# Patient Record
Sex: FEMALE | Race: WHITE | Employment: OTHER | URBAN - METROPOLITAN AREA
[De-identification: names, ages, dates, MRNs, and addresses within clinical notes are randomized per-mention and may not be internally consistent; named-entity substitution may affect disease eponyms.]

---

## 2017-01-25 ENCOUNTER — HOSPITAL ENCOUNTER (OUTPATIENT)
Dept: LAB | Age: 52
Setting detail: OUTPATIENT SURGERY
Discharge: HOME | End: 2017-01-25
Payer: COMMERCIAL

## 2017-01-25 ENCOUNTER — APPOINTMENT (OUTPATIENT)
Dept: PREADMISSION TESTING | Age: 52
End: 2017-01-25
Payer: COMMERCIAL

## 2017-01-25 ENCOUNTER — APPOINTMENT (OUTPATIENT)
Dept: GENERAL RADIOLOGY | Age: 52
End: 2017-01-25
Attending: SPECIALIST
Payer: COMMERCIAL

## 2017-01-25 ENCOUNTER — APPOINTMENT (OUTPATIENT)
Dept: LAB | Age: 52
End: 2017-01-25
Payer: COMMERCIAL

## 2017-01-25 ENCOUNTER — HOSPITAL ENCOUNTER (OUTPATIENT)
Age: 52
Setting detail: OUTPATIENT SURGERY
Discharge: HOME | End: 2017-01-25
Attending: SPECIALIST | Admitting: SPECIALIST
Payer: COMMERCIAL

## 2017-01-25 ENCOUNTER — NURSE NAVIGATOR (OUTPATIENT)
Dept: SURGERY | Age: 52
End: 2017-01-25

## 2017-01-25 VITALS
OXYGEN SATURATION: 99 % | SYSTOLIC BLOOD PRESSURE: 125 MMHG | WEIGHT: 194.4 LBS | HEIGHT: 66 IN | RESPIRATION RATE: 20 BRPM | HEART RATE: 81 BPM | DIASTOLIC BLOOD PRESSURE: 80 MMHG | TEMPERATURE: 98.1 F | BODY MASS INDEX: 31.24 KG/M2

## 2017-01-25 DIAGNOSIS — Z46.51 FITTING AND ADJUSTMENT OF GASTRIC LAP BAND: ICD-10-CM

## 2017-01-25 DIAGNOSIS — R63.5 WEIGHT GAIN: Primary | ICD-10-CM

## 2017-01-25 LAB
T4 FREE SERPL-MCNC: 1 NG/DL (ref 0.7–1.5)
TSH SERPL DL<=0.05 MIU/L-ACNC: 2.4 UIU/ML (ref 0.36–3.74)

## 2017-01-25 PROCEDURE — 76000 FLUOROSCOPY <1 HR PHYS/QHP: CPT

## 2017-01-25 PROCEDURE — 43999 UNLISTED PROCEDURE STOMACH: CPT | Performed by: SPECIALIST

## 2017-01-25 PROCEDURE — 36415 COLL VENOUS BLD VENIPUNCTURE: CPT | Performed by: SPECIALIST

## 2017-01-25 PROCEDURE — 74011000250 HC RX REV CODE- 250: Performed by: SPECIALIST

## 2017-01-25 PROCEDURE — 84443 ASSAY THYROID STIM HORMONE: CPT | Performed by: SPECIALIST

## 2017-01-25 PROCEDURE — 74011000255 HC RX REV CODE- 255: Performed by: SPECIALIST

## 2017-01-25 PROCEDURE — 84439 ASSAY OF FREE THYROXINE: CPT | Performed by: SPECIALIST

## 2017-01-25 RX ORDER — LIDOCAINE HYDROCHLORIDE 10 MG/ML
INJECTION INFILTRATION; PERINEURAL AS NEEDED
Status: DISCONTINUED | OUTPATIENT
Start: 2017-01-25 | End: 2017-01-25 | Stop reason: HOSPADM

## 2017-01-25 NOTE — PROCEDURES
Lap Band Encounter (fluroscopy clinic)    Mahin Turk is gastric banding patient who had her procedure on 12/29/08. her weight today is 88.2 kg (194 lb 6.4 oz), which correlates to 39% EBW loss. she is here today for Lap Band Adjustment / Fill with Fluoroscopy Guidance. she notes the following issues related to the banding procedure; - here for routine adjustment and suspicious of a possible leak in her system. Surgery related complications; mary anette band pt    Visit Vitals    /80    Pulse 81    Temp 98.1 °F (36.7 °C)    Resp 20    Ht 5' 6\" (1.676 m)    Wt 88.2 kg (194 lb 6.4 oz)    SpO2 99%    BMI 31.38 kg/m2       Past Medical History   Diagnosis Date    GERD (gastroesophageal reflux disease)     Overweight (BMI 25.0-29. 9)     Regurgitation     Smoking     Vomiting      Past Surgical History   Procedure Laterality Date    Pr lap, place adjust gastr band       12/29/2008 by Dr Neetu Pfeiffer Hx cholecystectomy      Hx gyn       LUCIA/BSO    Hx orthopaedic       left foot and achilles    Pr breast surgery procedure unlisted      Hx hernia repair       umbilical    Egd       Current Facility-Administered Medications   Medication Dose Route Frequency Provider Last Rate Last Dose    barium sulfate (EZ PAQUE) 96% (w/w) contrast suspension    PRN Raimundo Kessler MD   30 mL at 01/25/17 1310    lidocaine (XYLOCAINE) 10 mg/mL (1 %) injection    PRN Raimundo Kessler MD   1.5 mL at 01/25/17 1310          Review of Symptoms:     General - No history or complaints of unexpected fever or chills  Cardiac - No history or complaints of chest pain, palpitations, or shortness of breath  Pulmonary - No history or complaints of shortness of breath or productive cough  Gastrointestinal - as noted above        Physical Exam:    General:  alert, cooperative, no distress, appears stated age   Abdomen:   abdomen is soft without significant tenderness, masses, organomegaly or guarding; port in place Incisions: healing well, no significant drainage       Assessment:     1. History of Morbid obesity, status post gastric banding, given the fluro findings of a normal UGI with all 7.2 cc in her band we will proceed with the following adjustment. Plan:     Previous Fill Volume: 7.2 ml   Removed:       Total fill volume after today's adjustment: 7.7  Added: 0.5 ml         no leak in band system       Follow-up in PRN

## 2017-01-25 NOTE — DISCHARGE INSTRUCTIONS
[address]  [address] [address]. [facility] FOR [facility] [address] [facility], [address] [place] [address] Street [address] [address]7 Dioni Garay, Registered Dietician;  [phone]    To schedule your next adjustment, call David Clark @ [phone]    Post Adjustable Gastric Band Fill Instructions    Your band may now be fairly tight and some swelling may occur at the band site following a fill. Therefore, you should:   Stay on liquids for 2 days   Then on soft foods for 2 days   Then resume regular foods    All meals should fit into a 4 oz. (1/2 cup) container. Eating Tips:   Use a small plate   Put your fork down between bites   Eat slowly   Do not eat and drink at the same time. Tips for Weight Loss:   Exercise and protein will  help increase and maintain and build muscle mass   Exercise at least 30-40 min. each day. Include cardiovascular and resistance training.  Drink at least 8 glasses of water every day.  Take you multi-vitamins and B vitamins every day.  Journal your food   Keep carbohydrates less than 50Gm per day. Call for a band adjustment if:   You are losing less than 1 lb per week   You are having increasing hunger between meals    Call for evaluation if you develop reflux or inability to tolerate solid foods. Your band may be too tight. Make sure you have a follow up appointment.         Support Group Meetings  [facility]  2nd Thursday every month  6:00pm

## 2017-01-25 NOTE — IP AVS SNAPSHOT
Current Discharge Medication List  
  
ASK your doctor about these medications Dose & Instructions Dispensing Information Comments Morning Noon Evening Bedtime  
 aspirin delayed-release 81 mg tablet Your next dose is: Today, Tomorrow Other:  ____________ Take  by mouth daily. Refills:  0 PROTONIX 40 mg granules for oral suspension Generic drug:  pantoprazole Your next dose is: Today, Tomorrow Other:  ____________ Dose:  40 mg  
40 mg two (2) times a day. Refills:  0  
     
   
   
   
  
 VITAMIN D2 PO Your next dose is: Today, Tomorrow Other:  ____________ Take  by mouth. Refills:  0

## 2017-01-25 NOTE — IP AVS SNAPSHOT
[address] [place] [address] 
[phone] Patient: Reyes Speaker MRN: [id number] :1965 You are allergic to the following Allergen Reactions Penicillins Unknown (comments) Sulfa (Sulfonamide Antibiotics) Rash Topamax (Topiramate) Other (comments)  
 vision Recent Documentation Height Weight BMI OB Status Smoking Status 1.676 m 88.2 kg 31.38 kg/m2 Hysterectomy Current Every Day Smoker Emergency Contacts Name Discharge Info Relation Home Work Mobile Christopher Conroy DISCHARGE CAREGIVER [3] Spouse [3] [phone] About your hospitalization You were admitted on:  2017 You last received care in the:  [facility] ENDOSCOPY You were discharged on:  2017 Unit phone number:  [phone] Why you were hospitalized Your primary diagnosis was:  Not on File Providers Seen During Your Hospitalizations Provider Role Specialty Primary office phone Bradley Lindsey MD Attending Provider General Surgery [phone] Your Primary Care Physician (PCP) Primary Care Physician Office Phone Office Fax Zenobia Rock [phone] Follow-up Information None Current Discharge Medication List  
  
ASK your doctor about these medications Dose & Instructions Dispensing Information Comments Morning Noon Evening Bedtime  
 aspirin delayed-release 81 mg tablet Your next dose is: Today, Tomorrow Other:  _________ Take  by mouth daily. Refills:  0 PROTONIX 40 mg granules for oral suspension Generic drug:  pantoprazole Your next dose is: Today, Tomorrow Other:  _________ Dose:  40 mg  
40 mg two (2) times a day. Refills:  0  
     
   
   
   
  
 VITAMIN D2 PO Your next dose is: Today, Tomorrow Other:  _________ Take  by mouth. Refills:  0 Discharge Instructions [address]. Logan Rothman [address] [place] [address]. [facility] [address] 
[phone] Devin Lagunas, Registered Dietician;  [phone] To schedule your next adjustment, call Anitra Camtreva @ [phone] Post Adjustable Gastric Band Fill Instructions Your band may now be fairly tight and some swelling may occur at the band site following a fill. Therefore, you should: 
? Stay on liquids for 2 days ? Then on soft foods for 2 days ? Then resume regular foods All meals should fit into a 4 oz. (1/2 cup) container. Eating Tips: 
? Use a small plate ? Put your fork down between bites ? Eat slowly ? Do not eat and drink at the same time. Tips for Weight Loss: 
? Exercise and protein will  help increase and maintain and build muscle mass ? Exercise at least 30-40 min. each day. Include cardiovascular and resistance training. ? Drink at least 8 glasses of water every day. ? Take you multi-vitamins and B vitamins every day. ? Journal your food ? Keep carbohydrates less than 50Gm per day. Call for a band adjustment if: 
? You are losing less than 1 lb per week ? You are having increasing hunger between meals Call for evaluation if you develop reflux or inability to tolerate solid foods. Your band may be too tight. Make sure you have a follow up appointment. Support Group Meetings [facility] 2nd Thursday every month 
6:00pm 
 
Discharge Orders None Introducing [facility] HEALTH SERVICES! Romayne Duster introduces MoFuse patient portal. Now you can access parts of your medical record, email your doctor's office, and request medication refills online. 1. In your internet browser, go to [email or web address] 2. Click on the First Time User? Click Here link in the Sign In box. You will see the New Member Sign Up page. 3. Enter your Movista Access Code exactly as it appears below. You will not need to use this code after youve completed the sign-up process. If you do not sign up before the expiration date, you must request a new code. · Movista Access Code: [email or web address] Expires: 4/23/2017  6:16 AM 
 
4. Enter the last four digits of your Social Security Number (xxxx) and Date of Birth (mm/dd/yyyy) as indicated and click Submit. You will be taken to the next sign-up page. 5. Create a Movista ID. This will be your Movista login ID and cannot be changed, so think of one that is secure and easy to remember. 6. Create a Movista password. You can change your password at any time. 7. Enter your Password Reset Question and Answer. This can be used at a later time if you forget your password. 8. Enter your e-mail address. You will receive e-mail notification when new information is available in [address] 9. Click Sign Up. You can now view and download portions of your medical record. 10. Click the Download Summary menu link to download a portable copy of your medical information. If you have questions, please visit the Frequently Asked Questions section of the Movista website. Remember, Movista is NOT to be used for urgent needs. For medical emergencies, dial 911. Now available from your iPhone and Android! General Information Please provide this summary of care documentation to your next provider. Patient Signature:  ____________________________________________________________ Date:  ____________________________________________________________  
  
Carlitos Police Provider Signature:  ____________________________________________________________ Date:  ____________________________________________________________

## 2017-02-22 ENCOUNTER — APPOINTMENT (OUTPATIENT)
Dept: GENERAL RADIOLOGY | Age: 52
End: 2017-02-22
Attending: SPECIALIST
Payer: COMMERCIAL

## 2017-02-22 ENCOUNTER — HOSPITAL ENCOUNTER (OUTPATIENT)
Age: 52
Setting detail: OUTPATIENT SURGERY
Discharge: HOME | End: 2017-02-22
Attending: SPECIALIST | Admitting: SPECIALIST
Payer: COMMERCIAL

## 2017-02-22 ENCOUNTER — SURGERY (OUTPATIENT)
Age: 52
End: 2017-02-22

## 2017-02-22 VITALS
OXYGEN SATURATION: 100 % | HEIGHT: 66 IN | TEMPERATURE: 97.5 F | HEART RATE: 74 BPM | WEIGHT: 194 LBS | RESPIRATION RATE: 16 BRPM | SYSTOLIC BLOOD PRESSURE: 130 MMHG | DIASTOLIC BLOOD PRESSURE: 86 MMHG | BODY MASS INDEX: 31.18 KG/M2

## 2017-02-22 DIAGNOSIS — Z46.51 FITTING AND ADJUSTMENT OF GASTRIC LAP BAND: ICD-10-CM

## 2017-02-22 PROCEDURE — 76000 FLUOROSCOPY <1 HR PHYS/QHP: CPT

## 2017-02-22 PROCEDURE — 43999 UNLISTED PROCEDURE STOMACH: CPT | Performed by: SPECIALIST

## 2017-02-22 PROCEDURE — 74011000255 HC RX REV CODE- 255: Performed by: SPECIALIST

## 2017-02-22 PROCEDURE — 74011000250 HC RX REV CODE- 250: Performed by: SPECIALIST

## 2017-02-22 RX ORDER — LIDOCAINE HYDROCHLORIDE 10 MG/ML
INJECTION INFILTRATION; PERINEURAL AS NEEDED
Status: DISCONTINUED | OUTPATIENT
Start: 2017-02-22 | End: 2017-02-22 | Stop reason: HOSPADM

## 2017-02-22 RX ADMIN — BARIUM SULFATE 30 ML: 960 POWDER, FOR SUSPENSION ORAL at 14:13

## 2017-02-22 RX ADMIN — LIDOCAINE HYDROCHLORIDE 1.5 ML: 10 INJECTION, SOLUTION INFILTRATION; PERINEURAL at 14:13

## 2017-02-22 NOTE — PROCEDURES
Lap Band Encounter (fluroscopy clinic)    Mahin Turk is gastric banding patient who had her procedure on 12/29/08. her weight today is 88 kg (194 lb), which correlates to  % EBW loss. she is here today for Lap Band Adjustment / Fill with Fluoroscopy Guidance. she notes the following issues related to the banding procedure; - pt with severe epigastric pain after eating 4 grapes earlier today. Surgery related complications; prior mary anette pt    Visit Vitals    /86    Pulse 74    Temp 97.5 °F (36.4 °C)    Resp 16    Ht 5' 6\" (1.676 m)    Wt 88 kg (194 lb)    SpO2 100%    BMI 31.31 kg/m2       Past Medical History:   Diagnosis Date    GERD (gastroesophageal reflux disease)     Overweight (BMI 25.0-29. 9)     Regurgitation     Smoking     Vomiting      Past Surgical History:   Procedure Laterality Date    BREAST SURGERY PROCEDURE UNLISTED      EGD      HX CHOLECYSTECTOMY      HX GYN      LUCIA/BSO    HX HERNIA REPAIR      umbilical    HX ORTHOPAEDIC      left foot and achilles    LAP, PLACE ADJUST GASTR BAND      12/29/2008 by Dr Wilner Schaeffer     Current Facility-Administered Medications   Medication Dose Route Frequency Provider Last Rate Last Dose    barium sulfate (EZ PAQUE) 96% (w/w) contrast suspension    PRN Raimunod Kessler MD   30 mL at 02/22/17 1413    lidocaine (XYLOCAINE) 10 mg/mL (1 %) injection    PRN Raimundo Kessler MD   1.5 mL at 02/22/17 1413          Review of Symptoms:     General - No history or complaints of unexpected fever or chills  Cardiac - No history or complaints of chest pain, palpitations, or shortness of breath  Pulmonary - No history or complaints of shortness of breath or productive cough  Gastrointestinal - as noted above        Physical Exam:    General:  alert, cooperative, no distress, appears stated age   Abdomen:   abdomen is soft without significant tenderness, masses, organomegaly or guarding; port in place   Incisions: healing well, no significant drainage       Assessment:     1. History of Morbid obesity, status post gastric banding, given the fluro findings of retained food in her pouch and distal esophagus we will proceed with the following adjustment. Plan:     Previous Fill Volume: 7.7 ml   Removed: 1 ml    Total fill volume after today's adjustment: 6.7  Added:           pt with retained food in esophagus at 7.7ccs. This is one of many unfills for this patient. She is showing an overall inability to tolerate the band.   She would like to proceed with a single stage band to sleeve resection       Follow-up in PRN

## 2017-02-22 NOTE — DISCHARGE INSTRUCTIONS
Mom will call back   Verneita Cambric Dr. Kristi Dakin  [address] [facility]. [facility] FOR [facility] [address] [facility], [address] [place] [address] Street [address]  [address] Dioni Garay, Registered Dietician;  [phone]    To schedule your next adjustment, call Merari Stewart @ [phone]    Post Adjustable Gastric Band Fill Instructions    Your band may now be fairly tight and some swelling may occur at the band site following a fill. Therefore, you should:   Stay on liquids for 2 days   Then on soft foods for 2 days   Then resume regular foods    All meals should fit into a 4 oz. (1/2 cup) container. Eating Tips:   Use a small plate   Put your fork down between bites   Eat slowly   Do not eat and drink at the same time. Tips for Weight Loss:   Exercise and protein will  help increase and maintain and build muscle mass   Exercise at least 30-40 min. each day. Include cardiovascular and resistance training.  Drink at least 8 glasses of water every day.  Take you multi-vitamins and B vitamins every day.  Journal your food   Keep carbohydrates less than 50Gm per day. Call for a band adjustment if:   You are losing less than 1 lb per week   You are having increasing hunger between meals    Call for evaluation if you develop reflux or inability to tolerate solid foods. Your band may be too tight. Make sure you have a follow up appointment.         Support Group Meetings  [facility]  2nd Thursday every month  6:00pm

## 2017-02-22 NOTE — IP AVS SNAPSHOT
[address] [place] [address] [address] [address] 
[phone] Patient: Williams Jones MRN: [id number] :1965 You are allergic to the following Allergen Reactions Penicillins Unknown (comments) Sulfa (Sulfonamide Antibiotics) Rash Topamax (Topiramate) Other (comments)  
 vision Recent Documentation Height  
  
  
  
  
  
 1.676 m Emergency Contacts Name Discharge Info Relation Home Work Mobile RafiqChristopher DISCHARGE CAREGIVER [3] Spouse [3] [phone] About your hospitalization You were admitted on:  2017 You last received care in the:  [facility] ENDOSCOPY You were discharged on:  2017 Unit phone number:  [phone] Why you were hospitalized Your primary diagnosis was:  Not on File Providers Seen During Your Hospitalizations Provider Role Specialty Primary office phone Berry Shaffer MD Attending Provider General Surgery [phone] Your Primary Care Physician (PCP) Primary Care Physician Office Phone Office Fax Jorge Martinez [phone] Follow-up Information None Your Appointments 2017 GASTRIC BAND ADJUSTMENT with Berry Shaffer MD  
[facility] ENDOSCOPY [facility]) [address] [address] [phone] Current Discharge Medication List  
  
ASK your doctor about these medications Dose & Instructions Dispensing Information Comments Morning Noon Evening Bedtime  
 aspirin delayed-release 81 mg tablet Your next dose is: Today, Tomorrow Other:  _________ Take  by mouth daily. Refills:  0 PROTONIX 40 mg granules for oral suspension Generic drug:  pantoprazole Your next dose is: Today, Tomorrow Other:  _________ Dose:  40 mg  
40 mg two (2) times a day. Refills:  0  
     
   
   
   
  
 VITAMIN D2 PO Your next dose is: Today, Tomorrow Other:  _________ Take  by mouth. Refills:  0 Discharge Instructions Leo Simon [address] [place] [address]. [facility] [place] [address] [place] [address] - [address] 
[phone] Palomo Hung, Registered Dietician;  [phone] To schedule your next adjustment, call Liliana Cockayne @ [phone] Post Adjustable Gastric Band Fill Instructions Your band may now be fairly tight and some swelling may occur at the band site following a fill. Therefore, you should: 
? Stay on liquids for 2 days ? Then on soft foods for 2 days ? Then resume regular foods All meals should fit into a 4 oz. (1/2 cup) container. Eating Tips: 
? Use a small plate ? Put your fork down between bites ? Eat slowly ? Do not eat and drink at the same time. Tips for Weight Loss: 
? Exercise and protein will  help increase and maintain and build muscle mass ? Exercise at least 30-40 min. each day. Include cardiovascular and resistance training. ? Drink at least 8 glasses of water every day. ? Take you multi-vitamins and B vitamins every day. ? Journal your food ? Keep carbohydrates less than 50Gm per day. Call for a band adjustment if: 
? You are losing less than 1 lb per week ? You are having increasing hunger between meals Call for evaluation if you develop reflux or inability to tolerate solid foods. Your band may be too tight. Make sure you have a follow up appointment. Support Group Meetings [facility] 2nd Thursday every month 
6:00pm 
 
Discharge Orders None Introducing [facility] HEALTH SERVICES!    
 Janie Lebron introduces Medium patient portal. Now you can access parts of your medical record, email your doctor's office, and request medication refills online. 1. In your internet browser, go to [email or web address] 2. Click on the First Time User? Click Here link in the Sign In box. You will see the New Member Sign Up page. 3. Enter your Corebook Access Code exactly as it appears below. You will not need to use this code after youve completed the sign-up process. If you do not sign up before the expiration date, you must request a new code. · Corebook Access Code: [email or web address] Expires: 4/23/2017  6:16 AM 
 
4. Enter the last four digits of your Social Security Number (xxxx) and Date of Birth (mm/dd/yyyy) as indicated and click Submit. You will be taken to the next sign-up page. 5. Create a Corebook ID. This will be your Corebook login ID and cannot be changed, so think of one that is secure and easy to remember. 6. Create a Corebook password. You can change your password at any time. 7. Enter your Password Reset Question and Answer. This can be used at a later time if you forget your password. 8. Enter your e-mail address. You will receive e-mail notification when new information is available in [address] 9. Click Sign Up. You can now view and download portions of your medical record. 10. Click the Download Summary menu link to download a portable copy of your medical information. If you have questions, please visit the Frequently Asked Questions section of the Corebook website. Remember, Corebook is NOT to be used for urgent needs. For medical emergencies, dial 911. Now available from your iPhone and Android! General Information Please provide this summary of care documentation to your next provider. Patient Signature:  ____________________________________________________________ Date:  ____________________________________________________________  
  
Erlinda Damonosa  Provider Signature: ____________________________________________________________ Date:  ____________________________________________________________

## 2017-03-01 ENCOUNTER — TELEPHONE (OUTPATIENT)
Dept: SURGERY | Age: 52
End: 2017-03-01

## 2017-03-01 NOTE — TELEPHONE ENCOUNTER
Spoke with pt regarding LB removal to sleeve gastrectomy. Explained to her that [employer] will not approve second surgery due to her loosing more than 50% with LB.  (lowest weight 165 lbs)   Pt stated she would call [employer] herself and argue with them. I again told her I would help her with LB removal only suggested by Dr. Logan Bray.

## 2017-03-08 PROBLEM — R10.13 EPIGASTRIC PAIN: Status: ACTIVE | Noted: 2017-03-08

## 2017-03-08 PROBLEM — K22.719 BARRETT'S ESOPHAGUS WITH DYSPLASIA: Status: ACTIVE | Noted: 2017-03-08

## 2017-03-08 PROBLEM — K22.70 BARRETT'S ESOPHAGUS WITHOUT DYSPLASIA: Status: ACTIVE | Noted: 2017-03-08

## 2018-04-18 ENCOUNTER — OFFICE VISIT (OUTPATIENT)
Dept: SURGERY | Age: 53
End: 2018-04-18

## 2018-04-18 VITALS
OXYGEN SATURATION: 100 % | DIASTOLIC BLOOD PRESSURE: 66 MMHG | WEIGHT: 218.7 LBS | HEART RATE: 71 BPM | SYSTOLIC BLOOD PRESSURE: 148 MMHG | HEIGHT: 66 IN | BODY MASS INDEX: 35.15 KG/M2

## 2018-04-18 DIAGNOSIS — R11.2 INTRACTABLE VOMITING WITH NAUSEA, UNSPECIFIED VOMITING TYPE: ICD-10-CM

## 2018-04-18 DIAGNOSIS — I10 ESSENTIAL HYPERTENSION: ICD-10-CM

## 2018-04-18 DIAGNOSIS — K22.70 BARRETT'S ESOPHAGUS WITHOUT DYSPLASIA: ICD-10-CM

## 2018-04-18 DIAGNOSIS — K21.9 GASTROESOPHAGEAL REFLUX DISEASE WITHOUT ESOPHAGITIS: ICD-10-CM

## 2018-04-18 RX ORDER — LANOLIN ALCOHOL/MO/W.PET/CERES
1000 CREAM (GRAM) TOPICAL DAILY
COMMUNITY

## 2018-04-18 RX ORDER — METOPROLOL TARTRATE 50 MG/1
TABLET ORAL 2 TIMES DAILY
COMMUNITY
End: 2018-05-11

## 2018-04-18 RX ORDER — RANITIDINE 300 MG/1
300 TABLET ORAL
COMMUNITY

## 2018-04-18 NOTE — PROGRESS NOTES
Amirah Mcknight is a prior laparoscopic adjustable gastric band surgery   patient who underwent their weight loss surgical procedure procedure approximately 10 years ago by Dr Diamond King. Despite adequate   weight loss initially, the patient eventually developed pouch issues with dilation and chronic dysphagia, regurgitation and  that resulted in a   issues with weight maintenance and subsequent weight regain . We loosened her band last visit and she continues to experience severe reflux and regurgitation. She does have a history of Sharma's esophagus and is concerned with her symptoms as it pertains to this diagnosis. She would like to have the band removed and move to  A different procedure that will not only improve her weight but also no make her Sharma's worse. Despite several efforts to correct the issue with non-surgical   means she has been unable realize her initial success. Amirah Mcknight is now considering a revision procedure to the Gastric bypass procedure. Patient Active Problem List    Diagnosis Date Noted    Obesity, Class II, BMI 35-39.9, with comorbidity     Hypertension     History of Sharma's esophagus     Former smoker     Sharma's esophagus without dysplasia 03/08/2017    Epigastric pain 03/08/2017    Smoking     Overweight (BMI 25.0-29. 9)     GERD (gastroesophageal reflux disease)     Vomiting     Regurgitation       Past Surgical History:   Procedure Laterality Date    BREAST SURGERY PROCEDURE UNLISTED      EGD      HX CHOLECYSTECTOMY      HX GYN      LUCIA/BSO    HX HERNIA REPAIR      umbilical    HX ORTHOPAEDIC      left foot and achilles    LAP, PLACE ADJUST GASTR BAND      12/29/2008 by Dr Diamond King      Social History   Substance Use Topics    Smoking status: Former Smoker     Quit date: 4/18/2017    Smokeless tobacco: Never Used    Alcohol use Yes      History reviewed. No pertinent family history.    Current Outpatient Prescriptions   Medication Sig Dispense Refill    raNITIdine (ZANTAC) 300 mg tablet Take 300 mg by mouth daily.  cyanocobalamin (VITAMIN B-12) 1,000 mcg tablet Take 1,000 mcg by mouth daily.  B.infantis-B.ani-B.long-B.bifi (PROBIOTIC 4X) 10-15 mg TbEC Take  by mouth.  NALTREXONE HCL/BUPROPION HCL (CONTRAVE PO) Take  by mouth.  metoprolol tartrate (LOPRESSOR) 50 mg tablet Take  by mouth two (2) times a day.  pantoprazole (PROTONIX) 40 mg granules for oral suspension 40 mg two (2) times a day.  ERGOCALCIFEROL, VITAMIN D2, (VITAMIN D2 PO) Take  by mouth.  aspirin delayed-release 81 mg tablet Take  by mouth daily.        Allergies   Allergen Reactions    Penicillins Unknown (comments)    Sulfa (Sulfonamide Antibiotics) Rash    Topamax [Topiramate] Other (comments)     vision          Review of Systems:            General - No history or complaints of unexpected fever, chills, or weight loss  Head/Neck - No history or complaints of headache, diplopia, dysphagia, hearing loss  Cardiac - No history or complaints of chest pain, palpitations, murmur, or shortness of breath  Pulmonary - No history or complaints of shortness of breath, productive cough, hemoptysis  Gastrointestinal -+ reflux since placement of the band,no  abdominal pain, obstipation/constipation, blood per rectum  Genitourinary - No history or complaints of hematuria/dysuria, stress urinary incontinence symptoms, or renal lithiasis  Musculoskeletal - No history or complaints of joint pain or muscular weakness  Hematologic - No history or complaints of bleeding disorders, blood transfusions, sickle cell anemia  Neurologic - No history or complaints of  migraine headaches, seizure activity, syncopal episodes, TIA or stroke  Integumentary - No history or complaints of rashes, abnormal nevi, skin cancer  Gynecological - No history of heavy menses/abnormal menses           Objective:     Visit Vitals    /66 (BP 1 Location: Left arm)    Pulse 71    Ht 5' 6\" (1.676 m)  Wt 99.2 kg (218 lb 11.2 oz)    SpO2 100%    BMI 35.3 kg/m2       Physical Examination: General appearance - alert, well appearing, and in no distress and oriented to person, place, and time  Mental status - alert, oriented to person, place, and time, normal mood, behavior, speech, dress, motor activity, and thought processes  Eyes - pupils equal and reactive, extraocular eye movements intact, sclera anicteric, left eye normal, right eye normal  Ears - right ear normal, left ear normal  Nose - normal and patent, no erythema, discharge or polyps and not examined  Mouth - mucous membranes moist, pharynx normal without lesions  Neck - supple, no significant adenopathy  Lymphatics - no palpable lymphadenopathy, no hepatosplenomegaly  Chest - clear to auscultation, no wheezes, rales or rhonchi, symmetric air entry  Heart - normal rate, regular rhythm, normal S1, S2, no murmurs, rubs, clicks or gallops  Abdomen - soft, nontender, nondistended, no masses or organomegaly  Back exam - full range of motion, no tenderness, palpable spasm or pain on motion  Neurological - alert, oriented, normal speech, no focal findings or movement disorder noted  Musculoskeletal - no joint tenderness, deformity or swelling  Extremities - peripheral pulses normal, no pedal edema, no clubbing or cyanosis    Labs:     No results found for this or any previous visit (from the past 1008 hour(s)). Assessment:     Morbid obesity status post  laparoscopic adjustable gastric band surgery with persistent dysphagia, regurgitation   and a history of Sharma's esophagus. The patient  now has the  request for revision  to the gastric bypass. Plan:     1. At this time period I have spent 45 minutes discussing her anatomy and the issues with the band as it relates to her current symptoms and her specific issues at hand .  She understands the attempts thus far at making her a successful Lap Band patient have not had the beneficial effect that we had hoped for. She is frustrated with her progress to date and now wishes to consider an alternative procedure. She  understands that weight loss surgery is not as effective the second time around and carries a much higher risk overall if we choose to pursue that course of action. The patient understands that the only conversion procedure that is recommended in her situation would be the Gastric bypass procedure. We will proceed with a Lap band removal first then proceed with the gastric bypass after a 3 month medical weight loss plan is completed. Cammy oWods

## 2018-04-18 NOTE — MR AVS SNAPSHOT
[address] [facility] 
 
 
 [facility] [address] [facility] 
[phone] Patient: Zach Lott MRN: [id number] :1965 Visit Information Date & Time Provider Department Dept. Phone Encounter #  
 2018  9:45 AM MD [facility] [place] [id number] Upcoming Health Maintenance Date Due Pneumococcal 19-64 Medium Risk (1 of 1 - PPSV23) 3/6/1986 DTaP/Tdap/Td series (1 - Tdap) 3/6/1988 PAP AKA CERVICAL CYTOLOGY 3/6/1988 BREAST CANCER SCRN MAMMOGRAM 3/6/2017 FOBT Q 1 YEAR AGE 50-75 3/6/2017 Influenza Age 5 to Adult 2017 Allergies as of 2018  Review Complete On: 2018 By: Betty Gibson MD  
  
 Severity Noted Reaction Type Reactions Penicillins  10/03/2016    Unknown (comments) Sulfa (Sulfonamide Antibiotics)  10/03/2016    Rash Topamax [Topiramate]  10/03/2016    Other (comments)  
 vision Current Immunizations  Never Reviewed No immunizations on file. Not reviewed this visit Vitals BP Pulse Height(growth percentile) Weight(growth percentile) SpO2 BMI  
 148/66 (BP 1 Location: Left arm) 71 5' 6\" (1.676 m) 218 lb 11.2 oz (99.2 kg) 100% 35.3 kg/m2 OB Status Smoking Status Hysterectomy Former Smoker BMI and BSA Data Body Mass Index Body Surface Area  
 35.3 kg/m 2 2.15 m 2 Your Updated Medication List  
  
   
This list is accurate as of 18 10:39 AM.  Always use your most recent med list.  
  
  
  
  
 aspirin delayed-release 81 mg tablet Take  by mouth daily. CONTRAVE PO Take  by mouth.  
  
 metoprolol tartrate 50 mg tablet Commonly known as:  LOPRESSOR Take  by mouth two (2) times a day. PROBIOTIC 4X 10-15 mg Tbec Generic drug:  B.infantis-B.ani-B.long-B.bifi Take  by mouth. PROTONIX 40 mg granules for oral suspension Generic drug:  pantoprazole 40 mg two (2) times a day. VITAMIN B-12 1,000 mcg tablet Generic drug:  cyanocobalamin Take 1,000 mcg by mouth daily. VITAMIN D2 PO Take  by mouth. ZANTAC 300 mg tablet Generic drug:  raNITIdine Take 300 mg by mouth daily. Introducing [facility] [place] [facility] & HEALTH SERVICES! [employer] introduces [employer] patient portal. Now you can access parts of your medical record, email your doctor's office, and request medication refills online. 1. In your internet browser, go to [email or web address] 2. Click on the First Time User? Click Here link in the Sign In box. You will see the New Member Sign Up page. 3. Enter your [employer] Access Code exactly as it appears below. You will not need to use this code after youve completed the sign-up process. If you do not sign up before the expiration date, you must request a new code. · [employer] Access Code: [email or web address] Expires: 7/17/2018 10:39 AM 
 
4. Enter the last four digits of your Social Security Number (xxxx) and Date of Birth (mm/dd/yyyy) as indicated and click Submit. You will be taken to the next sign-up page. 5. Create a [employer] ID. This will be your [employer] login ID and cannot be changed, so think of one that is secure and easy to remember. 6. Create a [employer] password. You can change your password at any time. 7. Enter your Password Reset Question and Answer. This can be used at a later time if you forget your password. 8. Enter your e-mail address. You will receive e-mail notification when new information is available in [address] 9. Click Sign Up. You can now view and download portions of your medical record. 10. Click the Download Summary menu link to download a portable copy of your medical information. If you have questions, please visit the Frequently Asked Questions section of the [employer] website. Remember, [employer] is NOT to be used for urgent needs. For medical emergencies, dial 911. Now available from your iPhone and Android! Please provide this summary of care documentation to your next provider. Your primary care clinician is listed as Nate Hawthorne. If you have any questions after today's visit, please call [phone].

## 2018-05-02 ENCOUNTER — CLINICAL SUPPORT (OUTPATIENT)
Dept: SURGERY | Age: 53
End: 2018-05-02

## 2018-05-02 VITALS — BODY MASS INDEX: 34.07 KG/M2 | WEIGHT: 212 LBS | HEIGHT: 66 IN

## 2018-05-02 NOTE — PATIENT INSTRUCTIONS
Goals: 1. Try the [place] Complete multivitamin- hard texture. Take with food or take right before bed to help prevent of nausea. 2. Try the Premier protein drink- sell this at the [facility] in [place]. 3. Continue to not drink soda. Sugar free tea- no sugar- work towards this. 4. Increase activity as able with walking. Continue biking- may increase to 45 minutes and active at work. 5.  Follow the diet guide for band patients more than 6 months post-surgery.

## 2018-05-02 NOTE — PROGRESS NOTES
Medical Weight Loss Multi-Disciplinary Program    Name: Marciano Conroy   : 1965    Session# 1  Date: 2018     Height: 5' 6\" (167.6 cm)    Weight: 96.2 kg (212 lb) lbs. Body mass index is 34.22 kg/(m^2). Pounds Lost: 7    Dietary Instructions    Reviewed intake  Understanding low carbohydrates, low sugar, higher protein meals  Understanding proper portions  Instruction given for personal dietary changes    Comments: Pt given brief pre/post-op diet ed and diet hx reviewed. Physical Activity/Exercise    Discussed Perceived Compliance  Reasonable Goals Set  Motivation  Comments: Pt walks often and a lot at work. She uses her stationary bike daily (5 days per week) for 30 minutes. Goal to increase walking as able, continue bike, and perhaps increase bike to 45 minute duration. Behavior Modification    Positive attitude  Comments: Pt is working on the following goals:    1. Try the [place] Complete multivitamin- hard texture. Take with food or take right before bed to help prevent of nausea. 2. Try the Premier protein drink- sell this at the [facility] in [place]. 3. Continue to not drink soda. Sugar free tea- no sugar- work towards this. 4. Increase activity as able with walking. Continue biking- may increase to 45 minutes and active at work. 5.  Follow the diet guide for band patients more than 6 months post-surgery. Candidate for surgery (per RD): pending    Dietitian: PASCALE Hectorhanna Palacio is a 48 y.o. female who present for a pre-op evaluation. Visit Vitals    Ht 5' 6\" (1.676 m)    Wt 96.2 kg (212 lb)    BMI 34.22 kg/m2     Past Medical History:   Diagnosis Date    Former smoker     GERD (gastroesophageal reflux disease)     History of Sharma's esophagus     Hypertension     Obesity, Class II, BMI 35-39.9, with comorbidity     Overweight (BMI 25.0-29. 9)     Regurgitation     Vomiting            Procedure:  removal of gastric band to gastric bypass, revision       Summary:  Pt given brief pre/post-op diet ed and diet hx reviewed. Pt instructed to follow a low calorie, low carbohydrate, high protein diet of about 6787-7049 calories daily. Pt set several goals. See below. Have cut out regular soda for the past month. Current Vitamins: B complex daily, Vitamin D2 2000 units per day, probiotic, vomited with over 50 so did not take this, Contrave diet presciption currently     What have you done in the past to try to lose weight? Gastric band, [employer], [employer], Nutrisystem, exercise- [employer], own treadmill and exercise bike at home. Why didn't you lose weight or keep the weight off?: Weight watchers lost weight but the minute ate something off of the plan, regained weight       Patient Education and Materials Provided:  Supplement Resource Guide, B Vitamin Information, MVI Recommendations, Calcium Citrate Information, Bariatric Supplement Companies, Protein Supplement Information, Fluid Requirements, No Caffeine or Carbonation, No Alcohol for One Year Post Op, 3 Balanced Meals a Day, Food Group Guide, Exercising and Addressed Current Habits / Changes to make    Nutritional Hx: What is the number of meals you eat per day? 2    Do you eat between meals / snack? yes  Typical snack: yogurt- carb master, Slimfast shakes, Atkins shakes right now     How fast do you eat your meals? Slow and has to stand up since got band. How often do you eat fast food? Never     How many sodas/sugared beverages do you drink per day? Half sugar tea and half Splenda     How many caffeinated drinks do you have per day? Tea, sometimes coffee    How much milk and/or juice do you drink per day? Milk daily- about 2 glasses at 8 oz, 2% milk     How much water do you drink per day? 6 (8oz glasses)    How often do you consume alcohol? occasionally; 3 drinks per year     Diet History:  Breakfast  What are you eating and how much?  Skipped    When? ii Where? iii   Snacks  What are you eating and how much? Grapes    When? ii   Where? iii   Hydration  What are you eating and how much? Slimfast- Advanced- new has 6 carbs    When? 7:30 am    Where? ii   Lunch  What are you eating and how much? Skipped    When? ii   Where? iii   Snacks  What are you eating and how much? Special K Apple cinnamon bar- ate one of these, [employer]ress protein shake    When? ii   Where? iii   Hydration  What are you eating and how much? Water and tea throughout the day    When? ii   Where? iii   Dinner  What are you eating and how much? Tuna Sub on Flat bread with lettuce and cheese- 3 inches of this     When? 6:40 pm    Where? iii   Snacks  What are you eating and how much? i   When? ii   Where? iii   Hydration  What are you eating and how much? i   When? ii   Where? iii     Exercise:  Do you currently have an exercise routine? yes  What kind of exercise do you do? walks, active at work, stationary bike daily . For how long? 30 minutes on the bike For how often? 5 days per week    Goals:   1. Try the [place] Complete multivitamin- hard texture. Take with food or take right before bed to help prevent of nausea. 2. Try the Premier protein drink- sell this at the [facility] in [place]. 3. Continue to not drink soda. Sugar free tea- no sugar- work towards this. 4. Increase activity as able with walking. Continue biking- may increase to 45 minutes and active at work. 5.  Follow the diet guide for band patients more than 6 months post-surgery.

## 2018-05-02 NOTE — MR AVS SNAPSHOT
[address] [facility] 
 
 
 One [facility] [address] 
[phone] Patient: Caridad Blanton MRN: [id number] :1965 Visit Information Date & Time Provider Department Dept. Phone Encounter #  
 2018 10:00 AM TSS [address] [facility] [phone] [id number] Upcoming Health Maintenance Date Due DTaP/Tdap/Td series (1 - Tdap) 3/6/1988 PAP AKA CERVICAL CYTOLOGY 3/6/1988 BREAST CANCER SCRN MAMMOGRAM 3/6/2017 FOBT Q 1 YEAR AGE 50-75 3/6/2017 Influenza Age 5 to Adult 2018 Allergies as of 2018  Review Complete On: 2018 By: [place] Guzman MD  
  
 Severity Noted Reaction Type Reactions Penicillins  10/03/2016    Unknown (comments) Sulfa (Sulfonamide Antibiotics)  10/03/2016    Rash Topamax [Topiramate]  10/03/2016    Other (comments)  
 vision Current Immunizations  Never Reviewed No immunizations on file. Not reviewed this visit Vitals Height(growth percentile) Weight(growth percentile) BMI OB Status Smoking Status 5' 6\" (1.676 m) 212 lb (96.2 kg) 34.22 kg/m2 Hysterectomy Former Smoker BMI and BSA Data Body Mass Index Body Surface Area  
 34.22 kg/m 2 2.12 m 2 Your Updated Medication List  
  
   
This list is accurate as of 18 10:22 AM.  Always use your most recent med list.  
  
  
  
  
 aspirin delayed-release 81 mg tablet Take  by mouth daily. CONTRAVE PO Take  by mouth.  
  
 metoprolol tartrate 50 mg tablet Commonly known as:  LOPRESSOR Take  by mouth two (2) times a day. PROBIOTIC 4X 10-15 mg Tbec Generic drug:  B.infantis-B.ani-B.long-B.bifi Take  by mouth. PROTONIX 40 mg granules for oral suspension Generic drug:  pantoprazole 40 mg two (2) times a day. VITAMIN B-12 1,000 mcg tablet Generic drug:  cyanocobalamin Take 1,000 mcg by mouth daily.   
  
 VITAMIN D2 PO  
 Take  by mouth. ZANTAC 300 mg tablet Generic drug:  raNITIdine Take 300 mg by mouth daily. Patient Instructions Goals: 1. Try the [place] Complete multivitamin- hard texture. Take with food or take right before bed to help prevent of nausea. 2. Try the Premier protein drink- sell this at the [facility] in [place]. 3. Continue to not drink soda. Sugar free tea- no sugar- work towards this. 4. Increase activity as able with walking. Continue biking- may increase to 45 minutes and active at work. 5.  Follow the diet guide for band patients more than 6 months post-surgery. Introducing [facility] [place] [facility]! [facility] introduces [employer] patient portal. Now you can access parts of your medical record, email your doctor's office, and request medication refills online. 1. In your internet browser, go to [email or web address] 2. Click on the First Time User? Click Here link in the Sign In box. You will see the New Member Sign Up page. 3. Enter your [employer] Access Code exactly as it appears below. You will not need to use this code after youve completed the sign-up process. If you do not sign up before the expiration date, you must request a new code. · [employer] Access Code: [email or web address] Expires: 7/17/2018 10:39 AM 
 
4. Enter the last four digits of your Social Security Number (xxxx) and Date of Birth (mm/dd/yyyy) as indicated and click Submit. You will be taken to the next sign-up page. 5. Create a [employer] ID. This will be your [employer] login ID and cannot be changed, so think of one that is secure and easy to remember. 6. Create a [employer] password. You can change your password at any time. 7. Enter your Password Reset Question and Answer. This can be used at a later time if you forget your password. 8. Enter your e-mail address. You will receive e-mail notification when new information is available in [address] 9. Click Sign Up. You can now view and download portions of your medical record. 10. Click the Download Summary menu link to download a portable copy of your medical information. If you have questions, please visit the Frequently Asked Questions section of the [employer] website. Remember, [employer] is NOT to be used for urgent needs. For medical emergencies, dial 911. Now available from your iPhone and Android! Please provide this summary of care documentation to your next provider. Your primary care clinician is listed as Pavithra Perales. If you have any questions after today's visit, please call [phone].

## 2018-05-18 ENCOUNTER — HOSPITAL ENCOUNTER (OUTPATIENT)
Dept: PREADMISSION TESTING | Age: 53
Discharge: HOME | End: 2018-05-18
Attending: SPECIALIST
Payer: COMMERCIAL

## 2018-05-18 ENCOUNTER — TELEPHONE (OUTPATIENT)
Dept: SURGERY | Age: 53
End: 2018-05-18

## 2018-05-18 LAB
ALBUMIN SERPL-MCNC: 3.7 G/DL (ref 3.4–5)
ALBUMIN/GLOB SERPL: 1 {RATIO} (ref 0.8–1.7)
ALP SERPL-CCNC: 68 U/L (ref 45–117)
ALT SERPL-CCNC: 40 U/L (ref 13–56)
ANION GAP SERPL CALC-SCNC: 10 MMOL/L (ref 3–18)
AST SERPL-CCNC: 17 U/L (ref 15–37)
BASOPHILS # BLD: 0 K/UL (ref 0–0.06)
BASOPHILS NFR BLD: 1 % (ref 0–2)
BILIRUB SERPL-MCNC: 0.2 MG/DL (ref 0.2–1)
BUN SERPL-MCNC: 11 MG/DL (ref 7–18)
BUN/CREAT SERPL: 13 (ref 12–20)
CALCIUM SERPL-MCNC: 9.2 MG/DL (ref 8.5–10.1)
CHLORIDE SERPL-SCNC: 104 MMOL/L (ref 100–108)
CO2 SERPL-SCNC: 26 MMOL/L (ref 21–32)
CREAT SERPL-MCNC: 0.82 MG/DL (ref 0.6–1.3)
DIFFERENTIAL METHOD BLD: NORMAL
EOSINOPHIL # BLD: 0.2 K/UL (ref 0–0.4)
EOSINOPHIL NFR BLD: 2 % (ref 0–5)
ERYTHROCYTE [DISTWIDTH] IN BLOOD BY AUTOMATED COUNT: 14.5 % (ref 11.6–14.5)
GLOBULIN SER CALC-MCNC: 3.6 G/DL (ref 2–4)
GLUCOSE SERPL-MCNC: 121 MG/DL (ref 74–99)
HCT VFR BLD AUTO: 37.6 % (ref 35–45)
HGB BLD-MCNC: 12.2 G/DL (ref 12–16)
LYMPHOCYTES # BLD: 2.1 K/UL (ref 0.9–3.6)
LYMPHOCYTES NFR BLD: 25 % (ref 21–52)
MCH RBC QN AUTO: 28.6 PG (ref 24–34)
MCHC RBC AUTO-ENTMCNC: 32.4 G/DL (ref 31–37)
MCV RBC AUTO: 88.1 FL (ref 74–97)
MONOCYTES # BLD: 0.3 K/UL (ref 0.05–1.2)
MONOCYTES NFR BLD: 4 % (ref 3–10)
NEUTS SEG # BLD: 6 K/UL (ref 1.8–8)
NEUTS SEG NFR BLD: 68 % (ref 40–73)
PLATELET # BLD AUTO: 244 K/UL (ref 135–420)
PMV BLD AUTO: 11 FL (ref 9.2–11.8)
POTASSIUM SERPL-SCNC: 3.6 MMOL/L (ref 3.5–5.5)
PROT SERPL-MCNC: 7.3 G/DL (ref 6.4–8.2)
RBC # BLD AUTO: 4.27 M/UL (ref 4.2–5.3)
SODIUM SERPL-SCNC: 140 MMOL/L (ref 136–145)
WBC # BLD AUTO: 8.7 K/UL (ref 4.6–13.2)

## 2018-05-18 PROCEDURE — 36415 COLL VENOUS BLD VENIPUNCTURE: CPT | Performed by: SPECIALIST

## 2018-05-18 PROCEDURE — 80053 COMPREHEN METABOLIC PANEL: CPT | Performed by: SPECIALIST

## 2018-05-18 PROCEDURE — 93005 ELECTROCARDIOGRAM TRACING: CPT

## 2018-05-18 PROCEDURE — 85025 COMPLETE CBC W/AUTO DIFF WBC: CPT | Performed by: SPECIALIST

## 2018-05-19 LAB
ATRIAL RATE: 70 BPM
CALCULATED P AXIS, ECG09: 47 DEGREES
CALCULATED R AXIS, ECG10: 71 DEGREES
CALCULATED T AXIS, ECG11: 75 DEGREES
DIAGNOSIS, 93000: NORMAL
P-R INTERVAL, ECG05: 228 MS
Q-T INTERVAL, ECG07: 384 MS
QRS DURATION, ECG06: 100 MS
QTC CALCULATION (BEZET), ECG08: 414 MS
VENTRICULAR RATE, ECG03: 70 BPM

## 2018-05-29 ENCOUNTER — ANESTHESIA EVENT (OUTPATIENT)
Dept: SURGERY | Age: 53
End: 2018-05-29
Payer: COMMERCIAL

## 2018-05-29 ENCOUNTER — ANESTHESIA (OUTPATIENT)
Dept: SURGERY | Age: 53
End: 2018-05-29
Payer: COMMERCIAL

## 2018-05-29 ENCOUNTER — HOSPITAL ENCOUNTER (OUTPATIENT)
Age: 53
Setting detail: OUTPATIENT SURGERY
Discharge: HOME | End: 2018-05-29
Attending: SPECIALIST | Admitting: SPECIALIST
Payer: COMMERCIAL

## 2018-05-29 VITALS
HEIGHT: 66 IN | TEMPERATURE: 97.6 F | BODY MASS INDEX: 32.99 KG/M2 | WEIGHT: 205.25 LBS | SYSTOLIC BLOOD PRESSURE: 130 MMHG | OXYGEN SATURATION: 100 % | HEART RATE: 59 BPM | DIASTOLIC BLOOD PRESSURE: 66 MMHG | RESPIRATION RATE: 13 BRPM

## 2018-05-29 DIAGNOSIS — G89.18 POST-OP PAIN: Primary | ICD-10-CM

## 2018-05-29 PROCEDURE — 77030008683 HC TU ET CUF COVD -A: Performed by: ANESTHESIOLOGY

## 2018-05-29 PROCEDURE — 77030008477 HC STYL SATN SLP COVD -A: Performed by: ANESTHESIOLOGY

## 2018-05-29 PROCEDURE — 74011250636 HC RX REV CODE- 250/636: Performed by: SPECIALIST

## 2018-05-29 PROCEDURE — 76210000021 HC REC RM PH II 0.5 TO 1 HR: Performed by: SPECIALIST

## 2018-05-29 PROCEDURE — 74011250636 HC RX REV CODE- 250/636

## 2018-05-29 PROCEDURE — 76010000149 HC OR TIME 1 TO 1.5 HR: Performed by: SPECIALIST

## 2018-05-29 PROCEDURE — 77030016151 HC PROTCTR LNS DFOG COVD -B: Performed by: SPECIALIST

## 2018-05-29 PROCEDURE — 76060000033 HC ANESTHESIA 1 TO 1.5 HR: Performed by: SPECIALIST

## 2018-05-29 PROCEDURE — 74011000250 HC RX REV CODE- 250

## 2018-05-29 PROCEDURE — 74011250637 HC RX REV CODE- 250/637: Performed by: ANESTHESIOLOGY

## 2018-05-29 PROCEDURE — 74011000250 HC RX REV CODE- 250: Performed by: SPECIALIST

## 2018-05-29 PROCEDURE — 77030032490 HC SLV COMPR SCD KNE COVD -B: Performed by: SPECIALIST

## 2018-05-29 PROCEDURE — 77030031139 HC SUT VCRL2 J&J -A: Performed by: SPECIALIST

## 2018-05-29 PROCEDURE — 77030006643: Performed by: ANESTHESIOLOGY

## 2018-05-29 PROCEDURE — 77030008603 HC TRCR ENDOSC EPATH J&J -C: Performed by: SPECIALIST

## 2018-05-29 PROCEDURE — 88300 SURGICAL PATH GROSS: CPT | Performed by: SPECIALIST

## 2018-05-29 PROCEDURE — 77030012893: Performed by: SPECIALIST

## 2018-05-29 PROCEDURE — 76210000063 HC OR PH I REC FIRST 0.5 HR: Performed by: SPECIALIST

## 2018-05-29 PROCEDURE — 77030018836 HC SOL IRR NACL ICUM -A: Performed by: SPECIALIST

## 2018-05-29 PROCEDURE — 77030002933 HC SUT MCRYL J&J -A: Performed by: SPECIALIST

## 2018-05-29 PROCEDURE — 77030020782 HC GWN BAIR PAWS FLX 3M -B: Performed by: SPECIALIST

## 2018-05-29 PROCEDURE — 77030011640 HC PAD GRND REM COVD -A: Performed by: SPECIALIST

## 2018-05-29 RX ORDER — SODIUM CHLORIDE, SODIUM LACTATE, POTASSIUM CHLORIDE, CALCIUM CHLORIDE 600; 310; 30; 20 MG/100ML; MG/100ML; MG/100ML; MG/100ML
125 INJECTION, SOLUTION INTRAVENOUS CONTINUOUS
Status: CANCELLED | OUTPATIENT
Start: 2018-05-29

## 2018-05-29 RX ORDER — SCOLOPAMINE TRANSDERMAL SYSTEM 1 MG/1
1 PATCH, EXTENDED RELEASE TRANSDERMAL ONCE
Status: COMPLETED | OUTPATIENT
Start: 2018-05-29 | End: 2018-05-29

## 2018-05-29 RX ORDER — MIDAZOLAM HYDROCHLORIDE 1 MG/ML
INJECTION, SOLUTION INTRAMUSCULAR; INTRAVENOUS AS NEEDED
Status: DISCONTINUED | OUTPATIENT
Start: 2018-05-29 | End: 2018-05-29 | Stop reason: HOSPADM

## 2018-05-29 RX ORDER — SODIUM CHLORIDE, SODIUM LACTATE, POTASSIUM CHLORIDE, CALCIUM CHLORIDE 600; 310; 30; 20 MG/100ML; MG/100ML; MG/100ML; MG/100ML
125 INJECTION, SOLUTION INTRAVENOUS CONTINUOUS
Status: DISCONTINUED | OUTPATIENT
Start: 2018-05-29 | End: 2018-05-29 | Stop reason: HOSPADM

## 2018-05-29 RX ORDER — MAGNESIUM SULFATE 100 %
4 CRYSTALS MISCELLANEOUS AS NEEDED
Status: DISCONTINUED | OUTPATIENT
Start: 2018-05-29 | End: 2018-05-29 | Stop reason: HOSPADM

## 2018-05-29 RX ORDER — EPHEDRINE SULFATE/0.9% NACL/PF 25 MG/5 ML
SYRINGE (ML) INTRAVENOUS AS NEEDED
Status: DISCONTINUED | OUTPATIENT
Start: 2018-05-29 | End: 2018-05-29 | Stop reason: HOSPADM

## 2018-05-29 RX ORDER — NEOSTIGMINE METHYLSULFATE 5 MG/5 ML
SYRINGE (ML) INTRAVENOUS AS NEEDED
Status: DISCONTINUED | OUTPATIENT
Start: 2018-05-29 | End: 2018-05-29 | Stop reason: HOSPADM

## 2018-05-29 RX ORDER — HYDROMORPHONE HYDROCHLORIDE 2 MG/ML
0.5 INJECTION, SOLUTION INTRAMUSCULAR; INTRAVENOUS; SUBCUTANEOUS
Status: DISCONTINUED | OUTPATIENT
Start: 2018-05-29 | End: 2018-05-29 | Stop reason: SDUPTHER

## 2018-05-29 RX ORDER — CIPROFLOXACIN 2 MG/ML
400 INJECTION, SOLUTION INTRAVENOUS EVERY 12 HOURS
Status: COMPLETED | OUTPATIENT
Start: 2018-05-29 | End: 2018-05-29

## 2018-05-29 RX ORDER — OXYCODONE AND ACETAMINOPHEN 5; 325 MG/1; MG/1
1 TABLET ORAL
Qty: 30 TAB | Refills: 0 | Status: SHIPPED | OUTPATIENT
Start: 2018-05-29

## 2018-05-29 RX ORDER — DEXTROSE 50 % IN WATER (D50W) INTRAVENOUS SYRINGE
25-50 AS NEEDED
Status: DISCONTINUED | OUTPATIENT
Start: 2018-05-29 | End: 2018-05-29 | Stop reason: HOSPADM

## 2018-05-29 RX ORDER — ACETAMINOPHEN 10 MG/ML
1000 INJECTION, SOLUTION INTRAVENOUS ONCE
Status: COMPLETED | OUTPATIENT
Start: 2018-05-29 | End: 2018-05-29

## 2018-05-29 RX ORDER — PROPOFOL 10 MG/ML
INJECTION, EMULSION INTRAVENOUS AS NEEDED
Status: DISCONTINUED | OUTPATIENT
Start: 2018-05-29 | End: 2018-05-29 | Stop reason: HOSPADM

## 2018-05-29 RX ORDER — METOCLOPRAMIDE HYDROCHLORIDE 5 MG/ML
INJECTION INTRAMUSCULAR; INTRAVENOUS AS NEEDED
Status: DISCONTINUED | OUTPATIENT
Start: 2018-05-29 | End: 2018-05-29 | Stop reason: HOSPADM

## 2018-05-29 RX ORDER — FENTANYL CITRATE 50 UG/ML
INJECTION, SOLUTION INTRAMUSCULAR; INTRAVENOUS AS NEEDED
Status: DISCONTINUED | OUTPATIENT
Start: 2018-05-29 | End: 2018-05-29 | Stop reason: HOSPADM

## 2018-05-29 RX ORDER — FLUMAZENIL 0.1 MG/ML
0.2 INJECTION INTRAVENOUS
Status: DISCONTINUED | OUTPATIENT
Start: 2018-05-29 | End: 2018-05-29 | Stop reason: HOSPADM

## 2018-05-29 RX ORDER — NALOXONE HYDROCHLORIDE 0.4 MG/ML
0.1 INJECTION, SOLUTION INTRAMUSCULAR; INTRAVENOUS; SUBCUTANEOUS AS NEEDED
Status: DISCONTINUED | OUTPATIENT
Start: 2018-05-29 | End: 2018-05-29 | Stop reason: HOSPADM

## 2018-05-29 RX ORDER — KETOROLAC TROMETHAMINE 30 MG/ML
INJECTION, SOLUTION INTRAMUSCULAR; INTRAVENOUS AS NEEDED
Status: DISCONTINUED | OUTPATIENT
Start: 2018-05-29 | End: 2018-05-29 | Stop reason: HOSPADM

## 2018-05-29 RX ORDER — GLYCOPYRROLATE 0.2 MG/ML
INJECTION INTRAMUSCULAR; INTRAVENOUS AS NEEDED
Status: DISCONTINUED | OUTPATIENT
Start: 2018-05-29 | End: 2018-05-29 | Stop reason: HOSPADM

## 2018-05-29 RX ORDER — INSULIN LISPRO 100 [IU]/ML
INJECTION, SOLUTION INTRAVENOUS; SUBCUTANEOUS ONCE
Status: DISCONTINUED | OUTPATIENT
Start: 2018-05-29 | End: 2018-05-29 | Stop reason: HOSPADM

## 2018-05-29 RX ORDER — ENOXAPARIN SODIUM 100 MG/ML
40 INJECTION SUBCUTANEOUS ONCE
Status: COMPLETED | OUTPATIENT
Start: 2018-05-29 | End: 2018-05-29

## 2018-05-29 RX ORDER — ROCURONIUM BROMIDE 10 MG/ML
INJECTION, SOLUTION INTRAVENOUS AS NEEDED
Status: DISCONTINUED | OUTPATIENT
Start: 2018-05-29 | End: 2018-05-29 | Stop reason: HOSPADM

## 2018-05-29 RX ORDER — SODIUM CHLORIDE, SODIUM LACTATE, POTASSIUM CHLORIDE, CALCIUM CHLORIDE 600; 310; 30; 20 MG/100ML; MG/100ML; MG/100ML; MG/100ML
1000 INJECTION, SOLUTION INTRAVENOUS CONTINUOUS
Status: DISCONTINUED | OUTPATIENT
Start: 2018-05-29 | End: 2018-05-29 | Stop reason: HOSPADM

## 2018-05-29 RX ORDER — LIDOCAINE HYDROCHLORIDE 20 MG/ML
INJECTION, SOLUTION EPIDURAL; INFILTRATION; INTRACAUDAL; PERINEURAL AS NEEDED
Status: DISCONTINUED | OUTPATIENT
Start: 2018-05-29 | End: 2018-05-29 | Stop reason: HOSPADM

## 2018-05-29 RX ORDER — ONDANSETRON 2 MG/ML
INJECTION INTRAMUSCULAR; INTRAVENOUS AS NEEDED
Status: DISCONTINUED | OUTPATIENT
Start: 2018-05-29 | End: 2018-05-29 | Stop reason: HOSPADM

## 2018-05-29 RX ORDER — HYDROMORPHONE HYDROCHLORIDE 1 MG/ML
0.5 INJECTION, SOLUTION INTRAMUSCULAR; INTRAVENOUS; SUBCUTANEOUS
Status: DISCONTINUED | OUTPATIENT
Start: 2018-05-29 | End: 2018-05-29 | Stop reason: HOSPADM

## 2018-05-29 RX ORDER — SODIUM CHLORIDE 0.9 % (FLUSH) 0.9 %
5-10 SYRINGE (ML) INJECTION AS NEEDED
Status: DISCONTINUED | OUTPATIENT
Start: 2018-05-29 | End: 2018-05-29 | Stop reason: HOSPADM

## 2018-05-29 RX ORDER — BUPIVACAINE HYDROCHLORIDE AND EPINEPHRINE 5; 5 MG/ML; UG/ML
INJECTION, SOLUTION EPIDURAL; INTRACAUDAL; PERINEURAL AS NEEDED
Status: DISCONTINUED | OUTPATIENT
Start: 2018-05-29 | End: 2018-05-29 | Stop reason: HOSPADM

## 2018-05-29 RX ADMIN — ENOXAPARIN SODIUM 40 MG: 40 INJECTION SUBCUTANEOUS at 12:00

## 2018-05-29 RX ADMIN — ONDANSETRON 4 MG: 2 INJECTION INTRAMUSCULAR; INTRAVENOUS at 14:52

## 2018-05-29 RX ADMIN — Medication 3 MG: at 15:10

## 2018-05-29 RX ADMIN — LIDOCAINE HYDROCHLORIDE 60 MG: 20 INJECTION, SOLUTION EPIDURAL; INFILTRATION; INTRACAUDAL; PERINEURAL at 14:17

## 2018-05-29 RX ADMIN — SCOPALAMINE 1 PATCH: 1 PATCH, EXTENDED RELEASE TRANSDERMAL at 14:11

## 2018-05-29 RX ADMIN — GLYCOPYRROLATE 0.4 MG: 0.2 INJECTION INTRAMUSCULAR; INTRAVENOUS at 15:10

## 2018-05-29 RX ADMIN — SODIUM CHLORIDE, SODIUM LACTATE, POTASSIUM CHLORIDE, AND CALCIUM CHLORIDE: 600; 310; 30; 20 INJECTION, SOLUTION INTRAVENOUS at 14:51

## 2018-05-29 RX ADMIN — METOCLOPRAMIDE HYDROCHLORIDE 10 MG: 5 INJECTION INTRAMUSCULAR; INTRAVENOUS at 14:52

## 2018-05-29 RX ADMIN — ACETAMINOPHEN 1000 MG: 10 INJECTION, SOLUTION INTRAVENOUS at 14:31

## 2018-05-29 RX ADMIN — FENTANYL CITRATE 50 MCG: 50 INJECTION, SOLUTION INTRAMUSCULAR; INTRAVENOUS at 14:44

## 2018-05-29 RX ADMIN — CIPROFLOXACIN 400 MG: 2 INJECTION INTRAVENOUS at 14:25

## 2018-05-29 RX ADMIN — FENTANYL CITRATE 100 MCG: 50 INJECTION, SOLUTION INTRAMUSCULAR; INTRAVENOUS at 14:11

## 2018-05-29 RX ADMIN — KETOROLAC TROMETHAMINE 30 MG: 30 INJECTION, SOLUTION INTRAMUSCULAR; INTRAVENOUS at 14:52

## 2018-05-29 RX ADMIN — Medication 10 MG: at 14:38

## 2018-05-29 RX ADMIN — PROPOFOL 200 MG: 10 INJECTION, EMULSION INTRAVENOUS at 14:17

## 2018-05-29 RX ADMIN — FENTANYL CITRATE 100 MCG: 50 INJECTION, SOLUTION INTRAMUSCULAR; INTRAVENOUS at 14:53

## 2018-05-29 RX ADMIN — MIDAZOLAM HYDROCHLORIDE 2 MG: 1 INJECTION, SOLUTION INTRAMUSCULAR; INTRAVENOUS at 14:11

## 2018-05-29 RX ADMIN — ROCURONIUM BROMIDE 30 MG: 10 INJECTION, SOLUTION INTRAVENOUS at 14:17

## 2018-05-29 RX ADMIN — SODIUM CHLORIDE, SODIUM LACTATE, POTASSIUM CHLORIDE, AND CALCIUM CHLORIDE 125 ML/HR: 600; 310; 30; 20 INJECTION, SOLUTION INTRAVENOUS at 12:00

## 2018-05-29 NOTE — ANESTHESIA PREPROCEDURE EVALUATION
Anesthetic History     PONV          Review of Systems / Medical History  Patient summary reviewed, nursing notes reviewed and pertinent labs reviewed    Pulmonary    COPD: mild               Neuro/Psych   Within defined limits           Cardiovascular    Hypertension: well controlled              Exercise tolerance: >4 METS     GI/Hepatic/Renal     GERD: well controlled           Endo/Other        Morbid obesity and arthritis     Other Findings            Physical Exam    Airway  Mallampati: II  TM Distance: 4 - 6 cm  Neck ROM: normal range of motion   Mouth opening: Normal     Cardiovascular    Rhythm: regular  Rate: normal         Dental  No notable dental hx       Pulmonary  Breath sounds clear to auscultation               Abdominal  GI exam deferred       Other Findings            Anesthetic Plan    ASA: 2  Anesthesia type: general          Induction: Intravenous  Anesthetic plan and risks discussed with: Patient

## 2018-05-29 NOTE — OP NOTES
[address] REPORT    Name:Lourdes NICE  MR#: [id number]  : 1965  ACCOUNT #: [de-identified]   DATE OF SERVICE: 2018    PREOPERATIVE DIAGNOSIS:  Persistent dysphagia, status post gastric band procedure with history of Sharma's esophagus and severe reflux disease. POSTOPERATIVE DIAGNOSES:  1. Persistent dysphagia, status post gastric band procedure with history of Sharma's esophagus and severe reflux disease. 2.  Large hiatal hernia. 3.  Extensive intra-abdominal adhesions. 4.  Dilated gastric pouch. PROCEDURE PERFORMED  1. Extensive laparoscopic lysis of adhesions of greater than 20 minutes duration. 2.  Removal of lap band and port. SURGEON:  Shakeel Hoover MD      ASSISTANT:  FAINA Beaver. FAINA Lopez assisted with exposure during the procedure, lysis of adhesions, removal of lap band, release of plication sutures and closure of fascial and skin defect. ANESTHESIA:  General endotracheal.    COMPLICATIONS:  None. ESTIMATED BLOOD LOSS:  None. SPECIMENS REMOVED:  Lap band and port specimen. FINDINGS:    1. Patient noted to have a large hiatal hernia present. 2.  She presented a dilated gastric pouch. IMPLANTS:  none. STATEMENT OF MEDICAL NECESSITY:  The patient is a 44-year-old female, prior patient of Dr. Cristobal Alberto, who had a lap band placed about 9 years ago. She developed severe reflux disease and chronic dysphagia as obtained from the band and on an upper GI study, she is noted to have a dilated gastric pouch and dilated esophagus. She, in addition, has a history of Sharma's esophagus. At this juncture, I have recommended the removal of the lap band and she does wish to proceed with a bariatric procedure in the future, and she understands that the only option available to her will be the gastric bypass given her clinical diagnosis of Sharma's esophagus.   Plan today is for lap band and port removal and then a medical weight loss plan followed by potential gastric bypass procedure in the future. OPERATIVE PROCEDURE:  The patient was brought to operating room, placed on the table in supine position prep. General anesthesia was administered without any difficulty. Abdomen was then prepped and draped in the usual sterile fashion. Using 15 blade, a 1 cm incision was made just left of the umbilicus. Veress needle approach was used to connect the peritoneal cavity, which was then insufflated. The Visiport was then placed through that site. Then, the port was exposed to the right upper quadrant region. We freed it from the fascial attachments and removed from the operative field. The remainder of trocars were then placed in the usual fashion. On entering the abdomen, the patient appeared to have classic adhesions consistent with prior lap band placement. I lysed these adhesions from the omentum to the anterior abdominal wall in several areas. We then began to develop the left subhepatic space. I dissected off the attachments below this region and then I was able to identify the collar of the band itself. I then began to lyse adhesions associated with the lap band itself freeing up the entire exposed area of the Lap-Band, including the collar itself. I then divided the collar using the dissection erasto, I was able to remove the band in its entirety from the operative field. Once this was achieved, I then turned my attention to the release of plication sutures, and I went ahead and released 2 of these sutures in the process allowing the stomach to return back to normal position. It was obvious now by inspection, the patient had a dilated gastric pouch. She had a large hiatal hernia present also. With this having been completed, then placed a Surgicel SNoW along all exposed areas, which I dissected. I then removed the liver retractor and then removed all trocars under direct visualization.   I then closed the fascial incision on the right upper quadrant using a fascial stitch of 0 Vicryl suture and all skin incisions were then closed using 4-0 subcuticular Monocryl. Steri-Strips and sterile dressings were applied. The patient tolerated the procedure well.       Shauna Lundberg  D: 05/29/2018 18:06     T: 05/29/2018 18:55  JOB #: [id number]

## 2018-05-29 NOTE — ANESTHESIA POSTPROCEDURE EVALUATION
Post-Anesthesia Evaluation & Assessment    Visit Vitals    /65    Pulse 66    Temp 36.4 °C (97.6 °F)    Resp 15    Ht 5' 6\" (1.676 m)    Wt 93.1 kg (205 lb 4 oz)    SpO2 98%    BMI 33.13 kg/m2       No untreated/active PONV    Post-operative hydration adequate. Adequate post-operative analgesia per PACU discharge criteria    Mental status & level of consciousness: alert and oriented x 3    Respiratory status: patent unassisted airway     No apparent anesthetic complications requiring additional post-anesthetic care    Patient has met all discharge requirements.             Samm Gallo, CRNA

## 2018-05-29 NOTE — IP AVS SNAPSHOT
[address] [address] 
[phone] Patient: Marina Snyder MRN: [id number] :1965 About your hospitalization You were admitted on:  May 29, 2018 [facility] You were discharged on:  May 29, 2018 Why you were hospitalized Your primary diagnosis was:  Not on File Follow-up Information Follow up With Details Comments Contact Info MD Ashley Christianson  
[address] [phone] [facility] [address] [phone] Nikki Champagne MD Go in 1 week(s)  One [facility] [address] [facility] 
[phone] Your Scheduled Appointments 2018 10:30 AM EDT NUTRITION COUNSELING with TSS NUTRI VISIT PEN [facility] [place] ([facility])  
 One [facility] [address] [facility]  
[phone] 2018 10:30 AM EDT  
POST OP with FAINA Rivera [facility] [place] ([facility])  
 One [facility] [address] [facility]  
[phone] Discharge Orders None A check jesus manuel indicates which time of day the medication should be taken. My Medications START taking these medications Instructions Each Dose to Equal  
 Morning Noon Evening Bedtime  
 oxyCODONE-acetaminophen 5-325 mg per tablet Commonly known as:  PERCOCET Your last dose was: Your next dose is: Take 1 Tab by mouth every four (4) hours as needed for Pain. Max Daily Amount: 6 Tabs. 1 Tab CONTINUE taking these medications Instructions Each Dose to Equal  
 Morning Noon Evening Bedtime  
 aspirin delayed-release 81 mg tablet Your last dose was: Your next dose is: Take 81 mg by mouth daily.   
 81 mg  
    
   
   
   
  
 PROBIOTIC 4X 10-15 mg Tbec Generic drug:  B.infantis-B.ani-B.long-B.bifi Your last dose was: Your next dose is: Take  by mouth. PROTONIX 40 mg granules for oral suspension Generic drug:  pantoprazole Your last dose was: Your next dose is:    
   
   
 40 mg two (2) times a day. Indications: gastroesophageal reflux disease 40 mg  
    
   
   
   
  
 SUPER B COMPLEX PO Your last dose was: Your next dose is: Take 1 Cap by mouth daily. 1 Cap TOPROL XL 50 mg XL tablet Generic drug:  metoprolol succinate Your last dose was: Your next dose is: Take 50 mg by mouth nightly. Indications: hypertension 50 mg  
    
   
   
   
  
 VITAMIN B-12 1,000 mcg tablet Generic drug:  cyanocobalamin Your last dose was: Your next dose is: Take 1,000 mcg by mouth daily. 1000 mcg VITAMIN D3 2,000 unit Tab Generic drug:  cholecalciferol (vitamin D3) Your last dose was: Your next dose is: Take 1 Tab by mouth daily. 1 Tab ZANTAC 300 mg tablet Generic drug:  raNITIdine Your last dose was: Your next dose is: Take 300 mg by mouth nightly. Indications: gastroesophageal reflux disease 300 mg Where to Get Your Medications Information on where to get these meds will be given to you by the nurse or doctor. ! Ask your nurse or doctor about these medications  
  oxyCODONE-acetaminophen 5-325 mg per tablet Opioid Education Prescription Opioids: What You Need to Know: 
 
 
 
F-face looks uneven A-arms unable to move or move unevenly S-speech slurred or non-existent T-time-call 911 as soon as signs and symptoms begin-DO NOT go Back to bed or wait to see if you get better-TIME IS BRAIN. Warning Signs of HEART ATTACK Call 911 if you have these symptoms: 
? Chest discomfort. Most heart attacks involve discomfort in the center of the chest that lasts more than a few minutes, or that goes away and comes back. It can feel like uncomfortable pressure, squeezing, fullness, or pain. ? Discomfort in other areas of the upper body. Symptoms can include pain or discomfort in one or both arms, the back, neck, jaw, or stomach. ? Shortness of breath with or without chest discomfort. ? Other signs may include breaking out in a cold sweat, nausea, or lightheadedness. Don't wait more than five minutes to call [address]! Fast action can save your life. Calling 911 is almost always the fastest way to get lifesaving treatment. Emergency Medical Services staff can begin treatment when they arrive  up to an hour sooner than if someone gets to the hospital by car. The discharge information has been reviewed with the patient and caregiver. The patient and caregiver verbalized understanding. Discharge medications reviewed with the patient and caregiver and appropriate educational materials and side effects teaching were provided.  
___________________________________________________________________________ ________________________________________________________ Introducing [facility] HE[facility]! [employer] introduces Fangxin[employer]art patient portal. Now you can access parts of your medical record, email your doctor's office, and request medication refills online. 1. In your internet browser, go to [email or web address] 2. Click on the First Time User? Click Here link in the Sign In box. You will see the New Member Sign Up page. 3. Enter your Pixable Access Code exactly as it appears below. You will not need to use this code after youve completed the sign-up process. If you do not sign up before the expiration date, you must request a new code. · Pixable Access Code: [email or web address] Expires: 7/17/2018 10:39 AM 
 
4. Enter the last four digits of your Social Security Number (xxxx) and Date of Birth (mm/dd/yyyy) as indicated and click Submit. You will be taken to the next sign-up page. 5. Create a Pixable ID. This will be your Pixable login ID and cannot be changed, so think of one that is secure and easy to remember. 6. Create a Pixable password. You can change your password at any time. 7. Enter your Password Reset Question and Answer. This can be used at a later time if you forget your password. 8. Enter your e-mail address. You will receive e-mail notification when new information is available in [address] 9. Click Sign Up. You can now view and download portions of your medical record. 10. Click the Download Summary menu link to download a portable copy of your medical information. If you have questions, please visit the Frequently Asked Questions section of the Pixable website. Remember, Pixable is NOT to be used for urgent needs. For medical emergencies, dial 911. Now available from your iPhone and Android! Introducing Humberto Murray As a [employer] patient, I wanted to make you aware of our electronic visit tool called Humberto Murray. [employer] 24/7 allows you to connect within minutes with a medical provider 24 hours a day, seven days a week via a mobile device or tablet or logging into a secure website from your computer. You can access [employer] from anywhere in the [place]. A virtual visit might be right for you when you have a simple condition and feel like you just dont want to get out of bed, or cant get away from work for an appointment, when your regular [employer] provider is not available (evenings, weekends or holidays), or when youre out of town and need minor care. Electronic visits cost only $49 and if the [employer] 24/7 provider determines a prescription is needed to treat your condition, one can be electronically transmitted to a nearby pharmacy*. Please take a moment to enroll today if you have not already done so. The enrollment process is free and takes just a few minutes. To enroll, please download the [employer] 24/7 jani to your tablet or phone, or visit [email or web address] to enroll on your computer. And, as an [address] [place] [address] patient with a [employer]VPHD account, the results of your visits will be scanned into your electronic medical record and your primary care provider will be able to view the scanned results. We urge you to continue to see your regular [employer] provider for your ongoing medical care. And while your primary care provider may not be the one available when you seek a [employer]One[employer]an[employer] virtual visit, the peace of mind you get from getting a real diagnosis real time can be priceless. For more information on [employer]an[employer], view our Frequently Asked Questions (FAQs) at [email or web address]. Sincerely, 
 
Glenny Sabillon MD 
Chief Medical Officer [employer] *:  certain medications cannot be prescribed via VersionOneishanfin Providers Seen During Your Hospitalization Provider Specialty Primary office phone Emili Ochoa MD General Surgery [phone] Your Primary Care Physician (PCP) Primary Care Physician Office Phone Office Fax Giovani Atkinson [phone] You are allergic to the following Allergen Reactions Penicillins Unknown (comments) Sulfa (Sulfonamide Antibiotics) Rash Topamax (Topiramate) Other (comments)  
 vision Recent Documentation Height Weight BMI OB Status Smoking Status 1.676 m 93.1 kg 33.13 kg/m2 Hysterectomy Former Smoker Emergency Contacts Name Discharge Info Relation Home Work Mobile Christopher Conryo DISCHARGE CAREGIVER [3] Spouse [3]   [phone] Patient Belongings The following personal items are in your possession at time of discharge: 
  Dental Appliances: None  Visual Aid: Glasses, With patient      Home Medications: None   Jewelry: None  Clothing: Footwear, Shirt, Shorts, Socks, Undergarments (5)    Other Valuables: None Please provide this summary of care documentation to your next provider. Signatures-by signing, you are acknowledging that this After Visit Summary has been reviewed with you and you have received a copy. Patient Signature:  ____________________________________________________________ Date:  ____________________________________________________________  
  
[facility] Provider Signature:  ____________________________________________________________ Date:  ____________________________________________________________

## 2018-05-29 NOTE — PERIOP NOTES
TRANSFER - IN REPORT:    Verbal report received from [address] [place] [address] (name) on Kacie Grammes  being received from PACU (unit) for routine progression of care      Report consisted of patients Situation, Background, Assessment and   Recommendations(SBAR). Information from the following report(s) SBAR, Kardex, Procedure Summary and Intake/Output was reviewed with the receiving nurse. Opportunity for questions and clarification was provided. Assessment completed upon patients arrival to unit and care assumed.

## 2018-05-29 NOTE — H&P
Lizeth Valerio is a prior laparoscopic adjustable gastric band surgery   patient who underwent their weight loss surgical procedure procedure approximately 10 years ago by Dr So Reilly. Despite adequate   weight loss initially, the patient eventually developed pouch issues with dilation and chronic dysphagia, regurgitation and  that resulted in a   issues with weight maintenance and subsequent weight regain . We loosened her band last visit and she continues to experience severe reflux and regurgitation. She does have a history of Sharma's esophagus and is concerned with her symptoms as it pertains to this diagnosis. She would like to have the band removed and move to  A different procedure that will not only improve her weight but also no make her Sharma's worse. Despite several efforts to correct the issue with non-surgical   means she has been unable realize her initial success. Lizeth Valerio is now considering a revision procedure to the Gastric bypass procedure.          Patient Active Problem List     Diagnosis Date Noted    Obesity, Class II, BMI 35-39.9, with comorbidity      Hypertension      History of Sharma's esophagus      Former smoker      Sharma's esophagus without dysplasia 03/08/2017    Epigastric pain 03/08/2017    Smoking      Overweight (BMI 25.0-29. 9)      GERD (gastroesophageal reflux disease)      Vomiting      Regurgitation              Past Surgical History:   Procedure Laterality Date    BREAST SURGERY PROCEDURE UNLISTED        EGD        HX CHOLECYSTECTOMY        HX GYN         LUCIA/BSO    HX HERNIA REPAIR         umbilical    HX ORTHOPAEDIC         left foot and achilles    LAP, PLACE ADJUST GASTR BAND         12/29/2008 by Dr So Reilly             Social History   Substance Use Topics    Smoking status: Former Smoker       Quit date: 4/18/2017    Smokeless tobacco: Never Used    Alcohol use Yes       History reviewed. No pertinent family history.    Current Outpatient Prescriptions   Medication Sig Dispense Refill    raNITIdine (ZANTAC) 300 mg tablet Take 300 mg by mouth daily.        cyanocobalamin (VITAMIN B-12) 1,000 mcg tablet Take 1,000 mcg by mouth daily.        B.infantis-B.ani-B.long-B.bifi (PROBIOTIC 4X) 10-15 mg TbEC Take  by mouth.        NALTREXONE HCL/BUPROPION HCL (CONTRAVE PO) Take  by mouth.        metoprolol tartrate (LOPRESSOR) 50 mg tablet Take  by mouth two (2) times a day.        pantoprazole (PROTONIX) 40 mg granules for oral suspension 40 mg two (2) times a day.        ERGOCALCIFEROL, VITAMIN D2, (VITAMIN D2 PO) Take  by mouth.        aspirin delayed-release 81 mg tablet Take  by mouth daily.                Allergies   Allergen Reactions    Penicillins Unknown (comments)    Sulfa (Sulfonamide Antibiotics) Rash    Topamax [Topiramate] Other (comments)       vision            Review of Systems:               General - No history or complaints of unexpected fever, chills, or weight loss  Head/Neck - No history or complaints of headache, diplopia, dysphagia, hearing loss  Cardiac - No history or complaints of chest pain, palpitations, murmur, or shortness of breath  Pulmonary - No history or complaints of shortness of breath, productive cough, hemoptysis  Gastrointestinal -+ reflux since placement of the band,no  abdominal pain, obstipation/constipation, blood per rectum  Genitourinary - No history or complaints of hematuria/dysuria, stress urinary incontinence symptoms, or renal lithiasis  Musculoskeletal - No history or complaints of joint pain or muscular weakness  Hematologic - No history or complaints of bleeding disorders, blood transfusions, sickle cell anemia  Neurologic - No history or complaints of  migraine headaches, seizure activity, syncopal episodes, TIA or stroke  Integumentary - No history or complaints of rashes, abnormal nevi, skin cancer  Gynecological - No history of heavy menses/abnormal menses               Objective:     Visit Vitals    /66 (BP 1 Location: Left arm)    Pulse 71    Ht 5' 6\" (1.676 m)    Wt 99.2 kg (218 lb 11.2 oz)    SpO2 100%    BMI 35.3 kg/m2         Physical Examination: General appearance - alert, well appearing, and in no distress and oriented to person, place, and time  Mental status - alert, oriented to person, place, and time, normal mood, behavior, speech, dress, motor activity, and thought processes  Eyes - pupils equal and reactive, extraocular eye movements intact, sclera anicteric, left eye normal, right eye normal  Ears - right ear normal, left ear normal  Nose - normal and patent, no erythema, discharge or polyps and not examined  Mouth - mucous membranes moist, pharynx normal without lesions  Neck - supple, no significant adenopathy  Lymphatics - no palpable lymphadenopathy, no hepatosplenomegaly  Chest - clear to auscultation, no wheezes, rales or rhonchi, symmetric air entry  Heart - normal rate, regular rhythm, normal S1, S2, no murmurs, rubs, clicks or gallops  Abdomen - soft, nontender, nondistended, no masses or organomegaly  Back exam - full range of motion, no tenderness, palpable spasm or pain on motion  Neurological - alert, oriented, normal speech, no focal findings or movement disorder noted  Musculoskeletal - no joint tenderness, deformity or swelling  Extremities - peripheral pulses normal, no pedal edema, no clubbing or cyanosis     Labs:       Recent Results    No results found for this or any previous visit (from the past 1008 hour(s)).          Assessment:      Morbid obesity status post  laparoscopic adjustable gastric band surgery with persistent dysphagia, regurgitation   and a history of Sharma's esophagus. The patient  now has the  request for revision  to the gastric bypass.     Plan:      1. At this time period I have spent 45 minutes discussing her anatomy and the issues with the band as it relates to her current symptoms and her specific issues at hand . She understands the attempts thus far at making her a successful Lap Band patient have not had the beneficial effect that we had hoped for. She is frustrated with her progress to date and now wishes to consider an alternative procedure. She  understands that weight loss surgery is not as effective the second time around and carries a much higher risk overall if we choose to pursue that course of action. The patient understands that the only conversion procedure that is recommended in her situation would be the Gastric bypass procedure. We will proceed with a Lap band removal first then proceed with the gastric bypass after a 3 month medical weight loss plan is completed. .

## 2018-05-29 NOTE — BRIEF OP NOTE
BRIEF OPERATIVE NOTE    Date of Procedure: 5/29/2018   Preoperative Diagnosis: VOMITING, BARRETTS ESOPHAGUS, GERD, POST BARIATRIC SURGERY  Postoperative Diagnosis: VOMITING, BARRETTS ESOPHAGUS, GERD, POST BARIATRIC SURGERY    Procedure(s):  LAPAROSCOPIC LYSIS OF ADHESIONS  LAPAROSCOPIC ADJUSTABLE BAND AND PORT REMOVAL  Surgeon(s) and Role:     * Talita Tee MD - Primary         Surgical Assistant: fer    Surgical Staff:  Circ-1: Jeff Reynolds RN  Physician Assistant: FAINA Byrnes  Scrub Tech-1: ySlvia Drummond  Scrub Tech-2: Velvet Mejia  Event Time In   Incision Start 1435   Incision Close      Anesthesia: General   Estimated Blood Loss: scant  Specimens:   ID Type Source Tests Collected by Time Destination   1 : LAB BAND AND PORT Fresh Stomach  Talita Tee MD 5/29/2018 1440 Pathology      Findings: large hiatal hernia   Complications: none  Implants: * No implants in log *

## 2018-05-29 NOTE — DISCHARGE INSTRUCTIONS
Patient armband removed and shredded  DISCHARGE SUMMARY from Nurse    PATIENT INSTRUCTIONS:    After general anesthesia or intravenous sedation, for 24 hours or while taking prescription Narcotics:  · Limit your activities  · Do not drive and operate hazardous machinery  · Do not make important personal or business decisions  · Do  not drink alcoholic beverages  · If you have not urinated within 8 hours after discharge, please contact your surgeon on call. Report the following to your surgeon:  · Excessive pain, swelling, redness or odor of or around the surgical area  · Temperature over 100.5  · Nausea and vomiting lasting longer than 4 hours or if unable to take medications  · Any signs of decreased circulation or nerve impairment to extremity: change in color, persistent  numbness, tingling, coldness or increase pain  · Any questions    What to do at Home:  Recommended activity: Activity as tolerated, Activity as tolerated and no driving for today and Ambulate in house,     If you experience any of the following symptoms elevated temperature, pain not relived by medications prescribed for you, discharge from incisions , please follow up with Dr Laverne Balderas. *  Please give a list of your current medications to your Primary Care Provider. *  Please update this list whenever your medications are discontinued, doses are      changed, or new medications (including over-the-counter products) are added. *  Please carry medication information at all times in case of emergency situations. These are general instructions for a healthy lifestyle:    No smoking/ No tobacco products/ Avoid exposure to second hand smoke  Surgeon General's Warning:  Quitting smoking now greatly reduces serious risk to your health.     Obesity, smoking, and sedentary lifestyle greatly increases your risk for illness    A healthy diet, regular physical exercise & weight monitoring are important for maintaining a healthy lifestyle    You may be retaining fluid if you have a history of heart failure or if you experience any of the following symptoms:  Weight gain of 3 pounds or more overnight or 5 pounds in a week, increased swelling in our hands or feet or shortness of breath while lying flat in bed. Please call your doctor as soon as you notice any of these symptoms; do not wait until your next office visit. Recognize signs and symptoms of STROKE:    F-face looks uneven    A-arms unable to move or move unevenly    S-speech slurred or non-existent    T-time-call 911 as soon as signs and symptoms begin-DO NOT go       Back to bed or wait to see if you get better-TIME IS BRAIN. Warning Signs of HEART ATTACK     Call 911 if you have these symptoms:   Chest discomfort. Most heart attacks involve discomfort in the center of the chest that lasts more than a few minutes, or that goes away and comes back. It can feel like uncomfortable pressure, squeezing, fullness, or pain.  Discomfort in other areas of the upper body. Symptoms can include pain or discomfort in one or both arms, the back, neck, jaw, or stomach.  Shortness of breath with or without chest discomfort.  Other signs may include breaking out in a cold sweat, nausea, or lightheadedness. Don't wait more than five minutes to call 911 - MINUTES MATTER! Fast action can save your life. Calling 911 is almost always the fastest way to get lifesaving treatment. Emergency Medical Services staff can begin treatment when they arrive -- up to an hour sooner than if someone gets to the hospital by car. The discharge information has been reviewed with the patient and caregiver. The patient and caregiver verbalized understanding.   Discharge medications reviewed with the patient and caregiver and appropriate educational materials and side effects teaching were provided.   ___________________________________________________________________________________________________________________________________

## 2018-05-31 ENCOUNTER — TELEPHONE (OUTPATIENT)
Dept: SURGERY | Age: 53
End: 2018-05-31

## 2018-05-31 NOTE — TELEPHONE ENCOUNTER
Spoke with patient at 48 hour post lap band removal. Feeling well. Walking around house. Tolerating liquid diet and advanced to soft food today without difficulty. Post op pain controlled with pain medication as needed. Denies fevers, chills. Incision site without redness, drainage, swelling. Told patient to call office if any change. 2 week postop visit scheduled. DORON Reyna

## 2018-06-05 ENCOUNTER — CLINICAL SUPPORT (OUTPATIENT)
Dept: SURGERY | Age: 53
End: 2018-06-05

## 2018-06-05 VITALS — HEIGHT: 66 IN | BODY MASS INDEX: 33.27 KG/M2 | WEIGHT: 207 LBS

## 2018-06-12 ENCOUNTER — OFFICE VISIT (OUTPATIENT)
Dept: SURGERY | Age: 53
End: 2018-06-12

## 2018-06-12 VITALS
BODY MASS INDEX: 32.67 KG/M2 | HEIGHT: 66 IN | DIASTOLIC BLOOD PRESSURE: 74 MMHG | HEART RATE: 89 BPM | WEIGHT: 203.3 LBS | SYSTOLIC BLOOD PRESSURE: 137 MMHG | OXYGEN SATURATION: 100 % | TEMPERATURE: 98.5 F

## 2018-06-12 DIAGNOSIS — Z98.84 HISTORY OF REMOVAL OF LAPAROSCOPIC GASTRIC BANDING DEVICE: Primary | ICD-10-CM

## 2018-06-12 NOTE — PATIENT INSTRUCTIONS
Patient Instructions      1. Remember hydration goals - minimum of 64 ounces of liquids per day (dehydration is the number one reason for hospital readmission). 2. Continue to monitor carbohydrate and protein intake you need a minimum of  Grams of protein daily- remember to keep your total carbohydrates to 50 grams or less per day for best results. 3. Continue to work towards exercise goals - 60-90 minutes, 5 times a week minimum of deliberate, aerobic exercise is the ultimate goal with strength training 2 times each week. Refer to [employer] for  information. 4. Remember to take vitamins as directed in your handbook. 5. Attend support group the 2nd Thursday of each month. 6. Constipation: Milk of Magnesia is for immediate relief. Miralax is to be used every day if constipation is a chronic problem. 7. Diarrhea: patients will occasionally develop lactose intolerance after surgery. Check to see if your protein shake has whey in it. If it does try one that does not have whey and stop all yogurts, cheeses and milks to see if the diarrhea goes away. 8. Call us at [phone] or email us through [facility]" with questions,     concerns or worsening of condition, we have someone on call 24 hours a day. If you are unable to reach our office, you are to go to your Primary Care Physician or the Emergency Department. Supplement Resource Guide    Importance of Protein:   Maintains lean body mass, produces antibodies to fight off infections, heals wounds, minimizes hair loss, helps to give you energy, helps with satiety, and keeping you full between meals. Importance of Calcium:  Needed for healthy bones and teeth, normal blood clotting, and nervous system functioning, higher risk of osteoporosis and bone disease with non-compliance. Importance of Multivitamins: Many functions.   Supply you with extra nutrients that you may be missing from food. May lead to iron deficiency anemia, weakness, fatigue, and many other symptoms with non-compliance. Importance of B Vitamins:  Important for red blood cell formation, metabolism, energy, and helps to maintain a healthy nervous system. Protein Supplement  Find one you like now. Use immediately after surgery. Look for:  35-50g protein each day from your protein supplement once you reach the progression diet. 0-3 g fat per serving  0-3 g sugar per serving    Protein drinks should be split in separate dosages. Recommend: Lifelong  1 year + Calcium Supplement:     Start taking within a month after surgery. Look for: Calcium Citrate Plus D (1500 mg per day)  Recommend: Citracal     .            Avoid chocolate chewable calcium. Can use chewable bariatric or [employer] brand or similar chewable. The body cannot absorb more than 500-600 mg of calcium at a time. Take for Life Multi-vitamin Supplement:      Start immediately after surgery: any complete chewable, such as: [place]s Complete chewables. Avoid [place] sours or gummies. They lack iron and other important nutrients and also have added sugar. Continue with chewable vitamin or change to adult complete multivitamin one month after surgery. Menstruating women can take a prenatal vitamin. Make sure has at least 18 mg iron and 725-683 mcg folic acid   Vitamin G14, B Complex Vitamin, and Biotin  Start taking within a month after surgery. Vitamin B12:  1000 mcg of Vitamin B12 three times weekly    Must take sublingually (meaning you take it under your tongue) or in a liquid drop form for easy absorption. B Complex Vitamin: Take a pill or liquid drop form once daily. Biotin: This vitamin can help prevent hair loss.     Recommend 5mg   (5000 mcg) a day  Biotin is Optional

## 2018-06-12 NOTE — PROGRESS NOTES
Subjective:      Efrain Larry is a 48 y.o. female presents for postop care 2 weeks status post lap band removal.   Pain is well controlled. Appetite is good. Eating a regular diet without difficulty. Bowel movements are regular. Patient states that she is going to try to lose weight on her own and if she is not successful, she is going to consider LGBP. Objective:     Visit Vitals    /74 (BP 1 Location: Left arm, BP Patient Position: Sitting)    Pulse 89    Temp 98.5 °F (36.9 °C)    Ht 5' 6\" (1.676 m)    Wt 92.2 kg (203 lb 4.8 oz)    SpO2 100%    BMI 32.81 kg/m2       General:  alert, cooperative, no distress, appears stated age   Abdomen: soft, bowel sounds active, non-tender   Incision:   healing well, no drainage, no erythema, no hernia, no seroma, no swelling, no dehiscence, incision well approximated       Pathology:   GASTRIC LAP BAND AND PORT IDENTIFIED (GROSS DIAGNOSIS ONLY). Assessment:     Doing well postoperatively. Plan:     - Wound care discussed  - Pt is to increase activities as tolerated. - followup prn or if patient has questions, concerns or worsening of condition. If we are not available, patient is to go to the Emergency Department.

## 2018-08-02 ENCOUNTER — TELEPHONE (OUTPATIENT)
Dept: SURGERY | Age: 53
End: 2018-08-02

## 2018-08-02 NOTE — TELEPHONE ENCOUNTER
[facility]  Patient denies readmissions, reoperations, ED visits or IVF.

## 2022-03-18 PROBLEM — R10.13 EPIGASTRIC PAIN: Status: ACTIVE | Noted: 2017-03-08

## 2022-03-19 PROBLEM — K22.70 BARRETT'S ESOPHAGUS WITHOUT DYSPLASIA: Status: ACTIVE | Noted: 2017-03-08

## 2022-03-20 PROBLEM — Z98.84 HISTORY OF REMOVAL OF LAPAROSCOPIC GASTRIC BANDING DEVICE: Status: ACTIVE | Noted: 2018-06-12

## 2025-03-02 ENCOUNTER — APPOINTMENT (OUTPATIENT)
Facility: HOSPITAL | Age: 60
End: 2025-03-02

## 2025-03-02 ENCOUNTER — HOSPITAL ENCOUNTER (EMERGENCY)
Facility: HOSPITAL | Age: 60
Discharge: HOME | End: 2025-03-02
Attending: STUDENT IN AN ORGANIZED HEALTH CARE EDUCATION/TRAINING PROGRAM

## 2025-03-02 VITALS
BODY MASS INDEX: 36.16 KG/M2 | HEART RATE: 74 BPM | TEMPERATURE: 98.1 F | DIASTOLIC BLOOD PRESSURE: 85 MMHG | HEIGHT: 66 IN | SYSTOLIC BLOOD PRESSURE: 144 MMHG | OXYGEN SATURATION: 100 % | RESPIRATION RATE: 16 BRPM | WEIGHT: 225 LBS

## 2025-03-02 DIAGNOSIS — K11.5 SALIVARY DUCT STONES: Primary | ICD-10-CM

## 2025-03-02 LAB
ANION GAP SERPL CALC-SCNC: 2 MMOL/L (ref 3–18)
BASOPHILS # BLD: 0.06 K/UL (ref 0–0.1)
BASOPHILS NFR BLD: 0.7 % (ref 0–2)
BUN SERPL-MCNC: 9 MG/DL (ref 7–18)
BUN/CREAT SERPL: 14 (ref 12–20)
CALCIUM SERPL-MCNC: 8.5 MG/DL (ref 8.5–10.1)
CHLORIDE SERPL-SCNC: 106 MMOL/L (ref 100–111)
CO2 SERPL-SCNC: 31 MMOL/L (ref 21–32)
CREAT SERPL-MCNC: 0.65 MG/DL (ref 0.6–1.3)
DIFFERENTIAL METHOD BLD: ABNORMAL
EOSINOPHIL # BLD: 0.49 K/UL (ref 0–0.4)
EOSINOPHIL NFR BLD: 5.4 % (ref 0–5)
ERYTHROCYTE [DISTWIDTH] IN BLOOD BY AUTOMATED COUNT: 14.3 % (ref 11.6–14.5)
GLUCOSE SERPL-MCNC: 136 MG/DL (ref 74–99)
HCT VFR BLD AUTO: 37 % (ref 35–45)
HGB BLD-MCNC: 12.3 G/DL (ref 12–16)
IMM GRANULOCYTES # BLD AUTO: 0.06 K/UL (ref 0–0.04)
IMM GRANULOCYTES NFR BLD AUTO: 0.7 % (ref 0–0.5)
LYMPHOCYTES # BLD: 2.13 K/UL (ref 0.9–3.3)
LYMPHOCYTES NFR BLD: 23.6 % (ref 21–52)
MCH RBC QN AUTO: 29.3 PG (ref 24–34)
MCHC RBC AUTO-ENTMCNC: 33.2 G/DL (ref 31–37)
MCV RBC AUTO: 88.1 FL (ref 78–100)
MONOCYTES # BLD: 0.53 K/UL (ref 0.05–1.2)
MONOCYTES NFR BLD: 5.9 % (ref 3–10)
NEUTS SEG # BLD: 5.77 K/UL (ref 1.8–8)
NEUTS SEG NFR BLD: 63.7 % (ref 40–73)
NRBC # BLD: 0 K/UL (ref 0–0.01)
NRBC BLD-RTO: 0 PER 100 WBC
PLATELET # BLD AUTO: 220 K/UL (ref 135–420)
PMV BLD AUTO: 10.6 FL (ref 9.2–11.8)
POTASSIUM SERPL-SCNC: 3.9 MMOL/L (ref 3.5–5.5)
RBC # BLD AUTO: 4.2 M/UL (ref 4.2–5.3)
SODIUM SERPL-SCNC: 139 MMOL/L (ref 136–145)
WBC # BLD AUTO: 9 K/UL (ref 4.6–13.2)

## 2025-03-02 PROCEDURE — 80048 BASIC METABOLIC PNL TOTAL CA: CPT

## 2025-03-02 PROCEDURE — 6360000004 HC RX CONTRAST MEDICATION: Performed by: STUDENT IN AN ORGANIZED HEALTH CARE EDUCATION/TRAINING PROGRAM

## 2025-03-02 PROCEDURE — 70491 CT SOFT TISSUE NECK W/DYE: CPT

## 2025-03-02 PROCEDURE — 85025 COMPLETE CBC W/AUTO DIFF WBC: CPT

## 2025-03-02 PROCEDURE — 99285 EMERGENCY DEPT VISIT HI MDM: CPT

## 2025-03-02 RX ORDER — IOPAMIDOL 612 MG/ML
80 INJECTION, SOLUTION INTRAVASCULAR
Status: COMPLETED | OUTPATIENT
Start: 2025-03-02 | End: 2025-03-02

## 2025-03-02 RX ADMIN — IOPAMIDOL 80 ML: 612 INJECTION, SOLUTION INTRAVENOUS at 13:28

## 2025-03-02 ASSESSMENT — PAIN - FUNCTIONAL ASSESSMENT: PAIN_FUNCTIONAL_ASSESSMENT: 0-10

## 2025-03-02 ASSESSMENT — PAIN DESCRIPTION - ORIENTATION: ORIENTATION: MID

## 2025-03-02 ASSESSMENT — PAIN DESCRIPTION - LOCATION: LOCATION: NECK;MOUTH

## 2025-03-02 ASSESSMENT — PAIN SCALES - GENERAL: PAINLEVEL_OUTOF10: 5

## 2025-03-02 ASSESSMENT — PAIN DESCRIPTION - DESCRIPTORS: DESCRIPTORS: ACHING

## 2025-03-02 NOTE — ED PROVIDER NOTES
EMERGENCY DEPARTMENT HISTORY AND PHYSICAL EXAM      Date: 3/2/2025  Patient Name: Michelle Estrada    History of Presenting Illness     Chief Complaint   Patient presents with    Oral Swelling       Patient is a 59-year-old female with history as well as having to the emergency department for evaluation of oral swelling.  Patient states that she is having some swelling floor of her mouth on the left side.  Has been ongoing the last 2 days.  She had been evaluated in outside emergency department and was told that she had sialolithiasis and given instructions on management for it.  Her, patient states that the swelling does not seem to have improved.  States that she does have some difficulty with some swallowing but is still able to swallow.  No trouble breathing.  Denies any fevers or chills.          PCP: Ajit Meadows MD    No current facility-administered medications for this encounter.     No current outpatient medications on file.       Past History     Past Medical History:  Past Medical History:   Diagnosis Date    Diabetes mellitus (HCC)     GERD (gastroesophageal reflux disease)     Hyperlipidemia     Hypertension        Past Surgical History:  Past Surgical History:   Procedure Laterality Date    US I&D BREAST ABSCESS DEEP N/A 6/7/2024    US I&D BREAST ABSCESS DEEP 6/7/2024    US I&D BREAST ABSCESS DEEP N/A 8/4/2021    US I&D BREAST ABSCESS DEEP 8/4/2021     I&D BREAST ABSCESS DEEP N/A 2/6/2020    US I&D BREAST ABSCESS DEEP       Family History:  No family history on file.    Social History:  Social History     Tobacco Use    Smoking status: Every Day     Types: Cigarettes   Substance Use Topics    Alcohol use: Not Currently    Drug use: Never       Allergies:  Allergies   Allergen Reactions    Pcn [Penicillins] Other (See Comments)    Sulfa Antibiotics Hives    Topamax [Topiramate] Other (See Comments)         Review of Systems       Review of Systems   Constitutional:  Negative for activity change, fatigue

## 2025-03-02 NOTE — FLOWSHEET NOTE
03/02/25 1536   Departure Condition   Mobility at Departure Ambulatory   Ambulatory Mobility With No Difficulty   Departure Mode By self   Discharged To Home   Patient Teaching Discharge instructions reviewed;Patient verbalized understanding

## 2025-03-02 NOTE — FLOWSHEET NOTE
03/02/25 1243   HEENT   HEENT (WDL) X   Tongue Swollen     Swelling under the tongue and now in anterior side of neck.

## 2025-03-02 NOTE — DISCHARGE INSTRUCTIONS
Continue with sour candies to help express the stones. Follow up with your primary care doctor. Return to the emergency department for any worsening symptoms, fevers/chills, worsening trouble swallowing

## 2025-03-02 NOTE — ED TRIAGE NOTES
Selling under tongue and face. Was already seen by [facility] and they told her that it is a saliva gland but it is swollen so much now and her neck is swollen

## (undated) DEVICE — KENDALL SCD EXPRESS SLEEVES, KNEE LENGTH, MEDIUM: Brand: KENDALL SCD

## (undated) DEVICE — SUT MONOCRYL PLUS UD 4-0 --

## (undated) DEVICE — COVADERM PLUS: Brand: DEROYAL

## (undated) DEVICE — TROCAR ENDOSCP L100MM DIA15MM BLDELSS STBL SL ENDOPATH XCEL

## (undated) DEVICE — REM POLYHESIVE ADULT PATIENT RETURN ELECTRODE: Brand: VALLEYLAB

## (undated) DEVICE — STERILE POLYISOPRENE POWDER-FREE SURGICAL GLOVES: Brand: PROTEXIS

## (undated) DEVICE — 3M™ STERI-STRIP™ REINFORCED ADHESIVE SKIN CLOSURES, R1548, 1 IN X 5 IN (25 MM X 125 MM), 4 STRIPS/ENVELOPE: Brand: 3M™ STERI-STRIP™

## (undated) DEVICE — MAJ-1414 SINGLE USE ADPATER BIOPSY VALV: Brand: SINGLE USE ADAPTOR BIOPSY VALVE

## (undated) DEVICE — BARIATRIC: Brand: MEDLINE INDUSTRIES, INC.

## (undated) DEVICE — SUT VCRL + 0 27IN CT2 UD --

## (undated) DEVICE — TROCAR LAP L100MM DIA5MM BLDELSS W/ STBL SL ENDOPATH XCEL

## (undated) DEVICE — DEVON™ KNEE AND BODY STRAP 60" X 3" (1.5 M X 7.6 CM): Brand: DEVON

## (undated) DEVICE — SOL IRRIGATION INJ NACL 0.9% 500ML BTL

## (undated) DEVICE — PREP SKN PREVAIL 40ML APPL --

## (undated) DEVICE — VISUALIZATION SYSTEM: Brand: CLEARIFY